# Patient Record
Sex: MALE | Race: WHITE | Employment: OTHER | URBAN - METROPOLITAN AREA
[De-identification: names, ages, dates, MRNs, and addresses within clinical notes are randomized per-mention and may not be internally consistent; named-entity substitution may affect disease eponyms.]

---

## 2019-01-17 ENCOUNTER — HOSPITAL ENCOUNTER (OUTPATIENT)
Age: 69
Discharge: HOME | End: 2019-01-19
Payer: MEDICARE

## 2019-01-17 LAB
ALBUMIN SERPL-MCNC: 4.2 G/DL (ref 3.5–5.2)
ALP BLD-CCNC: 54 U/L (ref 40–129)
ALT SERPL-CCNC: 19 U/L (ref 0–40)
ANION GAP SERPL CALCULATED.3IONS-SCNC: 15 MMOL/L (ref 7–16)
AST SERPL-CCNC: 17 U/L (ref 0–39)
BASOPHILS ABSOLUTE: 0.08 E9/L (ref 0–0.2)
BASOPHILS RELATIVE PERCENT: 1 % (ref 0–2)
BILIRUB SERPL-MCNC: 0.2 MG/DL (ref 0–1.2)
BUN BLDV-MCNC: 13 MG/DL (ref 8–23)
CALCIUM SERPL-MCNC: 9 MG/DL (ref 8.6–10.2)
CHLORIDE BLD-SCNC: 105 MMOL/L (ref 98–107)
CHOLESTEROL, TOTAL: 187 MG/DL (ref 0–199)
CO2: 23 MMOL/L (ref 22–29)
CREAT SERPL-MCNC: 0.9 MG/DL (ref 0.7–1.2)
EOSINOPHILS ABSOLUTE: 0.21 E9/L (ref 0.05–0.5)
EOSINOPHILS RELATIVE PERCENT: 2.7 % (ref 0–6)
GFR AFRICAN AMERICAN: >60
GFR NON-AFRICAN AMERICAN: >60 ML/MIN/1.73
GLUCOSE BLD-MCNC: 107 MG/DL (ref 74–99)
HCT VFR BLD CALC: 44.3 % (ref 37–54)
HDLC SERPL-MCNC: 37 MG/DL
HEMOGLOBIN: 14.1 G/DL (ref 12.5–16.5)
IMMATURE GRANULOCYTES #: 0.04 E9/L
IMMATURE GRANULOCYTES %: 0.5 % (ref 0–5)
LDL CHOLESTEROL CALCULATED: 117 MG/DL (ref 0–99)
LYMPHOCYTES ABSOLUTE: 1.53 E9/L (ref 1.5–4)
LYMPHOCYTES RELATIVE PERCENT: 19.7 % (ref 20–42)
MCH RBC QN AUTO: 31 PG (ref 26–35)
MCHC RBC AUTO-ENTMCNC: 31.8 % (ref 32–34.5)
MCV RBC AUTO: 97.4 FL (ref 80–99.9)
MONOCYTES ABSOLUTE: 0.66 E9/L (ref 0.1–0.95)
MONOCYTES RELATIVE PERCENT: 8.5 % (ref 2–12)
NEUTROPHILS ABSOLUTE: 5.23 E9/L (ref 1.8–7.3)
NEUTROPHILS RELATIVE PERCENT: 67.6 % (ref 43–80)
PDW BLD-RTO: 13.2 FL (ref 11.5–15)
PLATELET # BLD: 251 E9/L (ref 130–450)
PMV BLD AUTO: 11 FL (ref 7–12)
POTASSIUM SERPL-SCNC: 4.6 MMOL/L (ref 3.5–5)
RBC # BLD: 4.55 E12/L (ref 3.8–5.8)
SODIUM BLD-SCNC: 143 MMOL/L (ref 132–146)
TOTAL PROTEIN: 7.1 G/DL (ref 6.4–8.3)
TRIGL SERPL-MCNC: 165 MG/DL (ref 0–149)
TSH SERPL DL<=0.05 MIU/L-ACNC: 1.67 UIU/ML (ref 0.27–4.2)
VITAMIN D 25-HYDROXY: 27 NG/ML (ref 30–100)
VLDLC SERPL CALC-MCNC: 33 MG/DL
WBC # BLD: 7.8 E9/L (ref 4.5–11.5)

## 2019-01-17 PROCEDURE — 82306 VITAMIN D 25 HYDROXY: CPT

## 2019-01-17 PROCEDURE — 84443 ASSAY THYROID STIM HORMONE: CPT

## 2019-01-17 PROCEDURE — 80061 LIPID PANEL: CPT

## 2019-01-17 PROCEDURE — 80053 COMPREHEN METABOLIC PANEL: CPT

## 2019-01-17 PROCEDURE — 85025 COMPLETE CBC W/AUTO DIFF WBC: CPT

## 2019-01-25 ENCOUNTER — HOSPITAL ENCOUNTER (OUTPATIENT)
Age: 69
Discharge: HOME | End: 2019-01-27
Payer: MEDICARE

## 2019-01-25 PROCEDURE — 84153 ASSAY OF PSA TOTAL: CPT

## 2019-01-25 PROCEDURE — G0103 PSA SCREENING: HCPCS

## 2019-03-21 LAB
PROSTATE SPECIFIC ANTIGEN: <0.01 NG/ML (ref 0–4)
PROSTATE SPECIFIC ANTIGEN: ABNORMAL NG/ML (ref 0–4)

## 2019-07-22 ENCOUNTER — HOSPITAL ENCOUNTER (OUTPATIENT)
Age: 69
Discharge: HOME | End: 2019-07-24
Payer: MEDICARE

## 2019-07-22 PROCEDURE — 87088 URINE BACTERIA CULTURE: CPT

## 2019-07-24 LAB — URINE CULTURE, ROUTINE: NORMAL

## 2019-09-04 ENCOUNTER — HOSPITAL ENCOUNTER (OUTPATIENT)
Age: 69
Discharge: HOME | End: 2019-09-06
Payer: MEDICARE

## 2019-09-04 PROCEDURE — 88112 CYTOPATH CELL ENHANCE TECH: CPT

## 2019-10-22 ENCOUNTER — HOSPITAL ENCOUNTER (OUTPATIENT)
Age: 69
Discharge: HOME | End: 2019-10-24

## 2019-10-22 PROCEDURE — 87088 URINE BACTERIA CULTURE: CPT

## 2019-10-24 LAB — URINE CULTURE, ROUTINE: NORMAL

## 2019-11-24 ENCOUNTER — APPOINTMENT (OUTPATIENT)
Dept: GENERAL RADIOLOGY | Age: 69
End: 2019-11-24
Payer: MEDICARE

## 2019-11-24 ENCOUNTER — HOSPITAL ENCOUNTER (EMERGENCY)
Age: 69
Discharge: HOME | End: 2019-11-24
Payer: MEDICARE

## 2019-11-24 ENCOUNTER — HOSPITAL ENCOUNTER (EMERGENCY)
Age: 69
Discharge: HOME | End: 2019-11-24
Attending: EMERGENCY MEDICINE
Payer: MEDICARE

## 2019-11-24 VITALS
TEMPERATURE: 98.2 F | HEIGHT: 70 IN | BODY MASS INDEX: 28.63 KG/M2 | RESPIRATION RATE: 18 BRPM | DIASTOLIC BLOOD PRESSURE: 85 MMHG | HEART RATE: 54 BPM | WEIGHT: 200 LBS | OXYGEN SATURATION: 96 % | SYSTOLIC BLOOD PRESSURE: 157 MMHG

## 2019-11-24 VITALS
BODY MASS INDEX: 27.89 KG/M2 | HEART RATE: 64 BPM | OXYGEN SATURATION: 95 % | RESPIRATION RATE: 16 BRPM | DIASTOLIC BLOOD PRESSURE: 82 MMHG | SYSTOLIC BLOOD PRESSURE: 174 MMHG | WEIGHT: 200 LBS | TEMPERATURE: 97.9 F

## 2019-11-24 DIAGNOSIS — J06.9 ACUTE UPPER RESPIRATORY INFECTION: ICD-10-CM

## 2019-11-24 DIAGNOSIS — R42 DIZZINESS: Primary | ICD-10-CM

## 2019-11-24 DIAGNOSIS — R42 VERTIGO: Primary | ICD-10-CM

## 2019-11-24 DIAGNOSIS — R23.1 CLAMMY SKIN: ICD-10-CM

## 2019-11-24 LAB
ANION GAP SERPL CALCULATED.3IONS-SCNC: 13 MMOL/L (ref 7–16)
BASOPHILS ABSOLUTE: 0.07 E9/L (ref 0–0.2)
BASOPHILS RELATIVE PERCENT: 1 % (ref 0–2)
BUN BLDV-MCNC: 12 MG/DL (ref 8–23)
CALCIUM SERPL-MCNC: 9.8 MG/DL (ref 8.6–10.2)
CHLORIDE BLD-SCNC: 103 MMOL/L (ref 98–107)
CO2: 25 MMOL/L (ref 22–29)
CREAT SERPL-MCNC: 1 MG/DL (ref 0.7–1.2)
EOSINOPHILS ABSOLUTE: 0.13 E9/L (ref 0.05–0.5)
EOSINOPHILS RELATIVE PERCENT: 1.8 % (ref 0–6)
GFR AFRICAN AMERICAN: >60
GFR NON-AFRICAN AMERICAN: >60 ML/MIN/1.73
GLUCOSE BLD-MCNC: 104 MG/DL (ref 74–99)
HCT VFR BLD CALC: 44.3 % (ref 37–54)
HEMOGLOBIN: 14.5 G/DL (ref 12.5–16.5)
IMMATURE GRANULOCYTES #: 0.04 E9/L
IMMATURE GRANULOCYTES %: 0.6 % (ref 0–5)
LYMPHOCYTES ABSOLUTE: 1.59 E9/L (ref 1.5–4)
LYMPHOCYTES RELATIVE PERCENT: 22.3 % (ref 20–42)
MAGNESIUM: 2.1 MG/DL (ref 1.6–2.6)
MCH RBC QN AUTO: 30.9 PG (ref 26–35)
MCHC RBC AUTO-ENTMCNC: 32.7 % (ref 32–34.5)
MCV RBC AUTO: 94.5 FL (ref 80–99.9)
MONOCYTES ABSOLUTE: 0.58 E9/L (ref 0.1–0.95)
MONOCYTES RELATIVE PERCENT: 8.1 % (ref 2–12)
NEUTROPHILS ABSOLUTE: 4.72 E9/L (ref 1.8–7.3)
NEUTROPHILS RELATIVE PERCENT: 66.2 % (ref 43–80)
PDW BLD-RTO: 13 FL (ref 11.5–15)
PLATELET # BLD: 261 E9/L (ref 130–450)
PMV BLD AUTO: 9.7 FL (ref 7–12)
POTASSIUM SERPL-SCNC: 4.5 MMOL/L (ref 3.5–5)
PRO-BNP: 36 PG/ML (ref 0–125)
RBC # BLD: 4.69 E12/L (ref 3.8–5.8)
SODIUM BLD-SCNC: 141 MMOL/L (ref 132–146)
STREP GRP A PCR: NEGATIVE
TROPONIN: <0.01 NG/ML (ref 0–0.03)
WBC # BLD: 7.1 E9/L (ref 4.5–11.5)

## 2019-11-24 PROCEDURE — 93005 ELECTROCARDIOGRAM TRACING: CPT | Performed by: STUDENT IN AN ORGANIZED HEALTH CARE EDUCATION/TRAINING PROGRAM

## 2019-11-24 PROCEDURE — 84484 ASSAY OF TROPONIN QUANT: CPT

## 2019-11-24 PROCEDURE — 83735 ASSAY OF MAGNESIUM: CPT

## 2019-11-24 PROCEDURE — 85025 COMPLETE CBC W/AUTO DIFF WBC: CPT

## 2019-11-24 PROCEDURE — 99212 OFFICE O/P EST SF 10 MIN: CPT

## 2019-11-24 PROCEDURE — 71045 X-RAY EXAM CHEST 1 VIEW: CPT

## 2019-11-24 PROCEDURE — 36415 COLL VENOUS BLD VENIPUNCTURE: CPT

## 2019-11-24 PROCEDURE — 6370000000 HC RX 637 (ALT 250 FOR IP): Performed by: EMERGENCY MEDICINE

## 2019-11-24 PROCEDURE — 80048 BASIC METABOLIC PNL TOTAL CA: CPT

## 2019-11-24 PROCEDURE — 83880 ASSAY OF NATRIURETIC PEPTIDE: CPT

## 2019-11-24 PROCEDURE — 99284 EMERGENCY DEPT VISIT MOD MDM: CPT

## 2019-11-24 PROCEDURE — 87880 STREP A ASSAY W/OPTIC: CPT

## 2019-11-24 RX ORDER — PSEUDOEPHEDRINE HYDROCHLORIDE 30 MG/1
30 TABLET ORAL EVERY 6 HOURS PRN
Qty: 24 TABLET | Refills: 0 | Status: SHIPPED | OUTPATIENT
Start: 2019-11-24 | End: 2019-12-01

## 2019-11-24 RX ORDER — MECLIZINE HCL 12.5 MG/1
25 TABLET ORAL ONCE
Status: COMPLETED | OUTPATIENT
Start: 2019-11-24 | End: 2019-11-24

## 2019-11-24 RX ADMIN — MECLIZINE 25 MG: 12.5 TABLET ORAL at 11:08

## 2019-11-24 ASSESSMENT — ENCOUNTER SYMPTOMS
EYE REDNESS: 0
WHEEZING: 0
CHEST TIGHTNESS: 0
BACK PAIN: 0
SINUS PRESSURE: 0
CONSTIPATION: 0
EYE PAIN: 0
ABDOMINAL PAIN: 0
RHINORRHEA: 0
BLOOD IN STOOL: 0
SORE THROAT: 0
NAUSEA: 0
ABDOMINAL DISTENTION: 0
COUGH: 0
DIARRHEA: 0
VOMITING: 0
SHORTNESS OF BREATH: 0
TROUBLE SWALLOWING: 0
PHOTOPHOBIA: 0

## 2019-11-26 LAB
EKG ATRIAL RATE: 52 BPM
EKG P AXIS: 51 DEGREES
EKG P-R INTERVAL: 168 MS
EKG Q-T INTERVAL: 452 MS
EKG QRS DURATION: 144 MS
EKG QTC CALCULATION (BAZETT): 420 MS
EKG R AXIS: 69 DEGREES
EKG T AXIS: 23 DEGREES
EKG VENTRICULAR RATE: 52 BPM

## 2021-02-12 ENCOUNTER — IMMUNIZATION (OUTPATIENT)
Dept: PRIMARY CARE CLINIC | Age: 71
End: 2021-02-12
Payer: MEDICARE

## 2021-02-12 PROCEDURE — 0011A COVID-19, MODERNA VACCINE 100MCG/0.5ML DOSE: CPT | Performed by: PHYSICIAN ASSISTANT

## 2021-02-12 PROCEDURE — 91301 COVID-19, MODERNA VACCINE 100MCG/0.5ML DOSE: CPT | Performed by: PHYSICIAN ASSISTANT

## 2021-03-15 ENCOUNTER — IMMUNIZATION (OUTPATIENT)
Dept: PRIMARY CARE CLINIC | Age: 71
End: 2021-03-15
Payer: MEDICARE

## 2021-03-15 PROCEDURE — 91301 COVID-19, MODERNA VACCINE 100MCG/0.5ML DOSE: CPT | Performed by: PHYSICIAN ASSISTANT

## 2021-03-15 PROCEDURE — 0012A COVID-19, MODERNA VACCINE 100MCG/0.5ML DOSE: CPT | Performed by: PHYSICIAN ASSISTANT

## 2021-06-02 ENCOUNTER — TELEPHONE (OUTPATIENT)
Dept: VASCULAR SURGERY | Age: 71
End: 2021-06-02

## 2021-06-03 ENCOUNTER — OFFICE VISIT (OUTPATIENT)
Dept: VASCULAR SURGERY | Age: 71
End: 2021-06-03
Payer: MEDICARE

## 2021-06-03 VITALS — WEIGHT: 205 LBS | HEIGHT: 71 IN | BODY MASS INDEX: 28.7 KG/M2

## 2021-06-03 DIAGNOSIS — I65.23 BILATERAL CAROTID ARTERY STENOSIS: ICD-10-CM

## 2021-06-03 DIAGNOSIS — Z87.891 HISTORY OF TOBACCO USE: ICD-10-CM

## 2021-06-03 DIAGNOSIS — I65.23 STENOSIS OF BOTH EXTERNAL CAROTID ARTERIES: ICD-10-CM

## 2021-06-03 PROCEDURE — 99204 OFFICE O/P NEW MOD 45 MIN: CPT | Performed by: SURGERY

## 2021-06-03 PROCEDURE — G8417 CALC BMI ABV UP PARAM F/U: HCPCS | Performed by: SURGERY

## 2021-06-03 PROCEDURE — G8427 DOCREV CUR MEDS BY ELIG CLIN: HCPCS | Performed by: SURGERY

## 2021-06-03 PROCEDURE — 4040F PNEUMOC VAC/ADMIN/RCVD: CPT | Performed by: SURGERY

## 2021-06-03 PROCEDURE — 3017F COLORECTAL CA SCREEN DOC REV: CPT | Performed by: SURGERY

## 2021-06-03 PROCEDURE — 1123F ACP DISCUSS/DSCN MKR DOCD: CPT | Performed by: SURGERY

## 2021-06-03 PROCEDURE — 1036F TOBACCO NON-USER: CPT | Performed by: SURGERY

## 2021-06-03 RX ORDER — NAPROXEN 500 MG/1
500 TABLET ORAL DAILY PRN
Status: ON HOLD | COMMUNITY
End: 2022-06-17 | Stop reason: HOSPADM

## 2021-06-03 RX ORDER — ROSUVASTATIN CALCIUM 10 MG/1
10 TABLET, COATED ORAL DAILY
COMMUNITY
End: 2022-06-14 | Stop reason: ALTCHOICE

## 2021-06-03 RX ORDER — ASPIRIN 81 MG/1
81 TABLET ORAL DAILY
COMMUNITY

## 2021-06-03 NOTE — PROGRESS NOTES
Used   Substance and Sexual Activity    Alcohol use: Yes     Alcohol/week: 1.0 standard drinks     Types: 1 Cans of beer per week    Drug use: No    Sexual activity: Not on file     Comment: N/A   Other Topics Concern    Not on file   Social History Narrative    Not on file     Social Determinants of Health     Financial Resource Strain:     Difficulty of Paying Living Expenses:    Food Insecurity:     Worried About Running Out of Food in the Last Year:     [address] in the Last Year:    Transportation Needs:     Lack of Transportation (Medical):  Lack of Transportation (Non-Medical):    Physical Activity:     Days of Exercise per Week:     Minutes of Exercise per Session:    Stress:     Feeling of Stress :    Social Connections:     Frequency of Communication with Friends and Family:     Frequency of Social Gatherings with Friends and Family:     Attends [employer] Services:     Active Member of Clubs or Organizations:     Attends Club or Organization Meetings:     Marital Status:    Intimate Partner Violence:     Fear of Current or Ex-Partner:     Emotionally Abused:     Physically Abused:     Sexually Abused:        Review of Systems:  Skin:  No abnormal pigmentation or rash  Eyes:  No blurring, diplopia or vision loss  Ears/Nose/Throat:  No hearing loss or vertigo  Respiratory:  No cough, pleuritic chest pain, dyspnea, or wheezing. Cardiovascular: No angina, palpitations . Gastrointestinal:  No nausea or vomiting; no abdominal pain or rectal bleeding  Musculoskeletal:  No arthritis or weakness. Neurologic:  No paralysis, paresis, paresthesia, seizures or headaches  Hematologic/Lymphatic/Immunologic:  No anemia, abnormal bleeding/bruising, fever, chills or night sweats. Endocrine:  No heat or cold intolerance. No polyphagia, polydipsia or polyuria. Physical Exam:  General appearance:  Alert, awake, oriented x 3. No distress. Skin:  Warm and dry  Head:  Normocephalic.   No masses, lesions or tenderness  Eyes:  Conjunctivae appear normal; PERRL  Ears:  External ears normal  Nose/Sinuses:  Septum midline, mucosa normal; no drainage  Oropharynx:  Clear, no exudate noted  Neck:  No jugular venous distention, lymphadenopathy or thyromegaly. Faint right carotid bruit noted  Lungs:  Clear to ausculation bilaterally. No rhonchi, crackles, wheezes  Heart:  Regular rate and rhythm. No rub or murmur  Abdomen:  Soft, non-tender. No masses, organomegaly. Musculoskeletal : No joint effusions, tenderness swelling    Neuro: Speech is intact. Moving all extremities. No focal motor or sensory deficits      Extremities:  Both feet are warm to touch. The color of both feet is normal.        Pulses Right  Left    Brachial 3 3    Radial    3=normal   Femoral 2 2  2=diminished   Popliteal    1=barely palpable   Dorsalis pedis    0=absent   Posterior tibial    4=aneurysmal             Other pertinent information:1. The past medical records were reviewed. 2.  Carotid ultrasound report reviewed, the velocities are consistent with 60% stenosis of the external carotid, however they reported that patient only mild stenosis of the internal carotid upper part with 30% stenosis    Assessment:    1. Bilateral external carotid stenosis of 60%     2. Bilateral internal carotid stenosis of 30%          Plan:       Discussed the patient, options risks benefits and alternatives explained, patient was reassured, informed him, that the external carotid arteries and not clinically important, but to call me anytime with any focal lateralizing neurological symptoms, explained    Patient also was informed, had to have a carotid ultrasound repeated in 3 to 5 days under the supervision of his PCP      Patient was instructed  to call if any focal lateralizing neurological symptoms like loss of speech, vision or loss of function of extremity. All the questions were answered.       Indicated follow-up: Return if symptoms worsen or fail to improve.

## 2021-06-09 ENCOUNTER — TELEPHONE (OUTPATIENT)
Dept: CASE MANAGEMENT | Age: 71
End: 2021-06-09

## 2021-06-09 NOTE — TELEPHONE ENCOUNTER
I called the patient and he confirmed his CT lung screening at [facility] on 6/10/2021 at 1:15 pm.  I reminded the patient to arrive at 12:45 pm, enter through the main entrance, and register. Patient confirmed.           Electronically signed by Edita Tsai on 6/9/21 at 1:13 PM EDT

## 2021-06-10 ENCOUNTER — HOSPITAL ENCOUNTER (OUTPATIENT)
Dept: CT IMAGING | Age: 71
Discharge: HOME | End: 2021-06-10
Payer: MEDICARE

## 2021-06-10 DIAGNOSIS — Z87.891 PERSONAL HISTORY OF TOBACCO USE, PRESENTING HAZARDS TO HEALTH: ICD-10-CM

## 2021-06-10 PROCEDURE — 71271 CT THORAX LUNG CANCER SCR C-: CPT

## 2021-06-11 ENCOUNTER — TELEPHONE (OUTPATIENT)
Dept: CASE MANAGEMENT | Age: 71
End: 2021-06-11

## 2021-06-11 NOTE — TELEPHONE ENCOUNTER
No call, encounter opened to process CT Lung Screening. CT Lung Screen: 6/10/2021      Impression   Emphysematous changes without evidence of a focal pulmonary nodule.       Incidental findings as above.       LUNG RADS:   Per ACR Lung-RADS Version 1.1       Category 1, Negative (No nodules and definitely benign nodules).       Management: Continue annual lung screening with LDCT in 12 months.       RECOMMENDATIONS:   If you would like to [facility] your patient [facility], please contact the Nurse Navigator at   [phone]. Pack years: 27    Social History     Tobacco Use  Smoking Status: Former Smoker    Start Date:    Quit Date: 01/01/2017   Types: Cigarettes   Packs/Day: 1   Years: 30   Pack Years: 27   Smokeless Tobacco: Never Used         Results letter sent to patient via my chart or mailed.      [address]

## 2022-06-13 ENCOUNTER — HOSPITAL ENCOUNTER (OUTPATIENT)
Dept: CT IMAGING | Age: 72
Discharge: HOME | End: 2022-06-13
Payer: MEDICARE

## 2022-06-13 DIAGNOSIS — Z87.891 PERSONAL HISTORY OF TOBACCO USE, PRESENTING HAZARDS TO HEALTH: ICD-10-CM

## 2022-06-13 DIAGNOSIS — F17.211 CIGARETTE NICOTINE DEPENDENCE IN REMISSION: ICD-10-CM

## 2022-06-13 PROCEDURE — 71271 CT THORAX LUNG CANCER SCR C-: CPT

## 2022-06-14 ENCOUNTER — HOSPITAL ENCOUNTER (OUTPATIENT)
Age: 72
Setting detail: OBSERVATION
Discharge: ANOTHER HOSPITAL | End: 2022-06-16
Attending: EMERGENCY MEDICINE | Admitting: INTERNAL MEDICINE
Payer: MEDICARE

## 2022-06-14 ENCOUNTER — APPOINTMENT (OUTPATIENT)
Dept: GENERAL RADIOLOGY | Age: 72
End: 2022-06-14
Payer: MEDICARE

## 2022-06-14 DIAGNOSIS — I10 HYPERTENSION, UNSPECIFIED TYPE: ICD-10-CM

## 2022-06-14 DIAGNOSIS — R07.9 CHEST PAIN WITH HIGH RISK FOR CARDIAC ETIOLOGY: Primary | ICD-10-CM

## 2022-06-14 LAB
ALBUMIN SERPL-MCNC: 4.3 G/DL (ref 3.5–5.2)
ALP BLD-CCNC: 62 U/L (ref 40–129)
ALT SERPL-CCNC: 11 U/L (ref 0–40)
ANION GAP SERPL CALCULATED.3IONS-SCNC: 8 MMOL/L (ref 7–16)
AST SERPL-CCNC: 16 U/L (ref 0–39)
BASOPHILS ABSOLUTE: 0.05 E9/L (ref 0–0.2)
BASOPHILS RELATIVE PERCENT: 0.7 % (ref 0–2)
BILIRUB SERPL-MCNC: <0.2 MG/DL (ref 0–1.2)
BUN BLDV-MCNC: 17 MG/DL (ref 6–23)
CALCIUM SERPL-MCNC: 9.1 MG/DL (ref 8.6–10.2)
CHLORIDE BLD-SCNC: 104 MMOL/L (ref 98–107)
CO2: 27 MMOL/L (ref 22–29)
CREAT SERPL-MCNC: 0.9 MG/DL (ref 0.7–1.2)
EOSINOPHILS ABSOLUTE: 0.21 E9/L (ref 0.05–0.5)
EOSINOPHILS RELATIVE PERCENT: 2.8 % (ref 0–6)
GFR AFRICAN AMERICAN: >60
GFR NON-AFRICAN AMERICAN: >60 ML/MIN/1.73
GLUCOSE BLD-MCNC: 108 MG/DL (ref 74–99)
HCT VFR BLD CALC: 42.4 % (ref 37–54)
HEMOGLOBIN: 13.9 G/DL (ref 12.5–16.5)
IMMATURE GRANULOCYTES #: 0.04 E9/L
IMMATURE GRANULOCYTES %: 0.5 % (ref 0–5)
LYMPHOCYTES ABSOLUTE: 2.06 E9/L (ref 1.5–4)
LYMPHOCYTES RELATIVE PERCENT: 27.2 % (ref 20–42)
MCH RBC QN AUTO: 30.5 PG (ref 26–35)
MCHC RBC AUTO-ENTMCNC: 32.8 % (ref 32–34.5)
MCV RBC AUTO: 93.2 FL (ref 80–99.9)
MONOCYTES ABSOLUTE: 0.68 E9/L (ref 0.1–0.95)
MONOCYTES RELATIVE PERCENT: 9 % (ref 2–12)
NEUTROPHILS ABSOLUTE: 4.52 E9/L (ref 1.8–7.3)
NEUTROPHILS RELATIVE PERCENT: 59.8 % (ref 43–80)
PDW BLD-RTO: 13.3 FL (ref 11.5–15)
PLATELET # BLD: 240 E9/L (ref 130–450)
PMV BLD AUTO: 9.9 FL (ref 7–12)
POTASSIUM REFLEX MAGNESIUM: 4.5 MMOL/L (ref 3.5–5)
PRO-BNP: 36 PG/ML (ref 0–125)
RBC # BLD: 4.55 E12/L (ref 3.8–5.8)
SODIUM BLD-SCNC: 139 MMOL/L (ref 132–146)
TOTAL PROTEIN: 6.9 G/DL (ref 6.4–8.3)
TROPONIN, HIGH SENSITIVITY: 18 NG/L (ref 0–11)
TROPONIN, HIGH SENSITIVITY: 19 NG/L (ref 0–11)
WBC # BLD: 7.6 E9/L (ref 4.5–11.5)

## 2022-06-14 PROCEDURE — 85025 COMPLETE CBC W/AUTO DIFF WBC: CPT

## 2022-06-14 PROCEDURE — 99285 EMERGENCY DEPT VISIT HI MDM: CPT

## 2022-06-14 PROCEDURE — 84484 ASSAY OF TROPONIN QUANT: CPT

## 2022-06-14 PROCEDURE — 71045 X-RAY EXAM CHEST 1 VIEW: CPT

## 2022-06-14 PROCEDURE — 80053 COMPREHEN METABOLIC PANEL: CPT

## 2022-06-14 PROCEDURE — G0378 HOSPITAL OBSERVATION PER HR: HCPCS

## 2022-06-14 PROCEDURE — 83880 ASSAY OF NATRIURETIC PEPTIDE: CPT

## 2022-06-14 PROCEDURE — 36415 COLL VENOUS BLD VENIPUNCTURE: CPT

## 2022-06-14 PROCEDURE — 6370000000 HC RX 637 (ALT 250 FOR IP): Performed by: EMERGENCY MEDICINE

## 2022-06-14 PROCEDURE — 93005 ELECTROCARDIOGRAM TRACING: CPT | Performed by: STUDENT IN AN ORGANIZED HEALTH CARE EDUCATION/TRAINING PROGRAM

## 2022-06-14 RX ORDER — NAPROXEN 500 MG/1
500 TABLET ORAL DAILY PRN
Status: DISCONTINUED | OUTPATIENT
Start: 2022-06-14 | End: 2022-06-15

## 2022-06-14 RX ORDER — PANTOPRAZOLE SODIUM 40 MG/1
40 TABLET, DELAYED RELEASE ORAL
Status: DISCONTINUED | OUTPATIENT
Start: 2022-06-15 | End: 2022-06-16 | Stop reason: HOSPADM

## 2022-06-14 RX ORDER — ACETAMINOPHEN 500 MG
500 TABLET ORAL EVERY 6 HOURS PRN
Status: DISCONTINUED | OUTPATIENT
Start: 2022-06-14 | End: 2022-06-16 | Stop reason: HOSPADM

## 2022-06-14 RX ORDER — POLYETHYLENE GLYCOL 3350 17 G/17G
17 POWDER, FOR SOLUTION ORAL DAILY PRN
Status: DISCONTINUED | OUTPATIENT
Start: 2022-06-14 | End: 2022-06-16 | Stop reason: HOSPADM

## 2022-06-14 RX ORDER — ASPIRIN 81 MG/1
81 TABLET ORAL DAILY
Status: DISCONTINUED | OUTPATIENT
Start: 2022-06-15 | End: 2022-06-16 | Stop reason: HOSPADM

## 2022-06-14 RX ORDER — ASPIRIN 81 MG/1
324 TABLET, CHEWABLE ORAL ONCE
Status: COMPLETED | OUTPATIENT
Start: 2022-06-14 | End: 2022-06-14

## 2022-06-14 RX ORDER — PROCHLORPERAZINE EDISYLATE 5 MG/ML
10 INJECTION INTRAMUSCULAR; INTRAVENOUS EVERY 6 HOURS PRN
Status: DISCONTINUED | OUTPATIENT
Start: 2022-06-14 | End: 2022-06-16 | Stop reason: HOSPADM

## 2022-06-14 RX ADMIN — ASPIRIN 81 MG CHEWABLE TABLET 324 MG: 81 TABLET CHEWABLE at 22:14

## 2022-06-14 ASSESSMENT — ENCOUNTER SYMPTOMS
ABDOMINAL PAIN: 0
PHOTOPHOBIA: 0
TROUBLE SWALLOWING: 0
RHINORRHEA: 0
CONSTIPATION: 0
VOMITING: 0
DIARRHEA: 1
COUGH: 1
VOICE CHANGE: 0
NAUSEA: 0
SHORTNESS OF BREATH: 0

## 2022-06-14 ASSESSMENT — PAIN - FUNCTIONAL ASSESSMENT: PAIN_FUNCTIONAL_ASSESSMENT: NONE - DENIES PAIN

## 2022-06-14 NOTE — ED PROVIDER NOTES
HPI   Patient is a 78-year-old male with past medical history of bilateral coronary stenosis and hypercholesterolemia presenting to the emergency department due to worsening left-sided arm and chest pain. Patient states that his symptoms started acutely yesterday. He reported that he was mowing the lawn when his pain started. Patient localizes pain to his left upper arm and chest region. Pain is intermittent and sharp with radiation to his elbow and neck. Patient states that his pain is worse with exertion. Nothing in particular makes the pain better or worse. He denies any recent trauma to the neck or muscle injury. Patient denies similar pain in the past.  He also is endorsing a tingling sensation in his left arm. Patient also was having cold sweats today. He is denying any other symptoms including nausea, vomiting, headache, fever, chills, lightheadedness, syncope, vision changes, urinary symptoms, abdominal pain, constipation or diarrhea. Patient denies any personal cardiac history. Patient states that he has a chronic cough but this is because he is a former smoker. Review of Systems   Constitutional: Positive for diaphoresis. Negative for chills and fever. Cold sweat   HENT: Negative for congestion, ear pain, rhinorrhea, trouble swallowing and voice change. Eyes: Negative for photophobia and visual disturbance. Respiratory: Positive for cough. Negative for shortness of breath. Cough, chronic and stable   Cardiovascular: Positive for chest pain. Negative for palpitations. Gastrointestinal: Positive for diarrhea. Negative for abdominal pain, constipation, nausea and vomiting. Diarrhea Sunday, resolved. Genitourinary: Negative for dysuria, flank pain, hematuria and urgency. Musculoskeletal: Positive for neck pain. Negative for arthralgias and myalgias. Skin: Negative for rash and wound. Neurological: Positive for numbness.  Negative for dizziness, weakness and headaches. Psychiatric/Behavioral: Negative for behavioral problems and confusion. Physical Exam  Constitutional:       General: He is not in acute distress. Appearance: Normal appearance. He is not ill-appearing. HENT:      Head: Normocephalic and atraumatic. Eyes:      Pupils: Pupils are equal, round, and reactive to light. Cardiovascular:      Rate and Rhythm: Normal rate and regular rhythm. Pulses: Normal pulses. Heart sounds: Normal heart sounds. Pulmonary:      Effort: Pulmonary effort is normal. No respiratory distress. Breath sounds: Normal breath sounds. No wheezing or rales. Abdominal:      Tenderness: There is no abdominal tenderness. There is no guarding or rebound. Musculoskeletal:      Cervical back: Normal range of motion and neck supple. Skin:     General: Skin is warm and dry. Neurological:      General: No focal deficit present. Mental Status: He is alert and oriented to person, place, and time. Cranial Nerves: No cranial nerve deficit. Coordination: Coordination normal.          Procedures   EKG: This EKG is signed and interpreted by me. Sinus bradycardia with ventricular rate of 55 bpm.  DC interval normal.  QTc not prolonged. Right bundle branch block. No evidence of acute ST elevation MI. No significant changes compared to previous EKG on 11/24/2019. MDM   Patient is a 20-year-old male with past medical history of bilateral coronary stenosis and hypercholesterolemia presenting to the emergency department due to worsening left-sided arm and chest pain. Patient's pain started acutely yesterday. On initial evaluation, patient is well-appearing, hemodynamically stable and in no acute distress. Physical exam. Initial troponin 18 with a repeat of 19, delta 1. EKG sinus with no evidence of acute ischemic changes. Chest x-ray was unremarkable. Patient has multiple risk factors and concerning story for ACS.   He has not had a stress test in over 5 years. Given aspirin in the emergency department. Plan to admit the patient for further work-up cardiac work-up and evaluation. Patient hemodynamically stable and agreeable with this plan. Dr. Elke Tena accepted patient for admission. ED Course as of 06/14/22 2136 Tue Jun 14, 2022 2133 ATTENDING PROVIDER ATTESTATION:     I have personally performed and/or participated in the history, exam, medical decision making, and procedures and agree with all pertinent clinical information unless otherwise noted. I have also reviewed and agree with the past medical, family and social history unless otherwise noted. I have discussed this patient in detail with the resident and provided the instruction and education regarding the evidence-based evaluation and treatment of chest pain equivalents. History: Patient states yesterday, while mowing the yard, he began having left-sided chest pain radiating to his left shoulder and into his left neck. The more he would continue to exert himself in the hot weather, the worst of pain with that. When he would sit down to rest, the pain will slowly go away. Today, he was running back and forth between the grill inside the house and he noted that when he would exert himself in this manner, the pain would also come back around but, again go away with rest.    My findings: Jeremy Pathak is a 71 y.o. male whom is in no distress. Physical exam reveals he is alert and oriented. Heart is regular, lungs are clear. Abdomen soft and nontender. Extremities are intact without edema. Good distal pulse and cap refill. Skin is warm and dry. No pain currently. Manipulation of his arms and shoulders does not reproduce the pain. My plan: Symptomatic and supportive care. EKG, x-ray, chest x-ray, labs.   Admission for chest pain evaluation    Electronically signed by Kiana Singh DO on 6/14/22 at 9:33 PM EDT       [TG]      ED Course User Index  [TG] Leo Solomon DO          --------------------------------------------- PAST HISTORY ---------------------------------------------  Past Medical History:  has a past medical history of Arthritis, Bilateral carotid artery stenosis, Cancer ([address] DILLAN (cerebral atherosclerosis), History of tobacco use, Hypercholesterolemia, and Stenosis of both external carotid arteries. Past Surgical History:  has a past surgical history that includes joint replacement; Prostatectomy (2009); and Cardiac catheterization. Social History:  reports that he quit smoking about 5 years ago. He has a 30.00 pack-year smoking history. He has never used smokeless tobacco. He reports current alcohol use of about 1.0 standard drink of alcohol per week. He reports that he does not use drugs. Family History: family history includes Cancer in his father and mother; Mult Sclerosis in his sister. The patients home medications have been reviewed. Allergies: Patient has no known allergies.     -------------------------------------------------- RESULTS -------------------------------------------------    LABS:  Results for orders placed or performed during the hospital encounter of 06/14/22   CBC with Auto Differential   Result Value Ref Range    WBC 7.6 4.5 - 11.5 E9/L    RBC 4.55 3.80 - 5.80 E12/L    Hemoglobin 13.9 12.5 - 16.5 g/dL    Hematocrit 42.4 37.0 - 54.0 %    MCV 93.2 80.0 - 99.9 fL    MCH 30.5 26.0 - 35.0 pg    MCHC 32.8 32.0 - 34.5 %    RDW 13.3 11.5 - 15.0 fL    Platelets 928 250 - 557 E9/L    MPV 9.9 7.0 - 12.0 fL    Neutrophils % 59.8 43.0 - 80.0 %    Immature Granulocytes % 0.5 0.0 - 5.0 %    Lymphocytes % 27.2 20.0 - 42.0 %    Monocytes % 9.0 2.0 - 12.0 %    Eosinophils % 2.8 0.0 - 6.0 %    Basophils % 0.7 0.0 - 2.0 %    Neutrophils Absolute 4.52 1.80 - 7.30 E9/L    Immature Granulocytes # 0.04 E9/L    Lymphocytes Absolute 2.06 1.50 - 4.00 E9/L    Monocytes Absolute 0.68 0.10 - 0.95 E9/L    Eosinophils Absolute 0.21 0.05 - 0.50 E9/L    Basophils Absolute 0.05 0.00 - 0.20 E9/L   Comprehensive Metabolic Panel w/ Reflex to MG   Result Value Ref Range    Sodium 139 132 - 146 mmol/L    Potassium reflex Magnesium 4.5 3.5 - 5.0 mmol/L    Chloride 104 98 - 107 mmol/L    CO2 27 22 - 29 mmol/L    Anion Gap 8 7 - 16 mmol/L    Glucose 108 (H) 74 - 99 mg/dL    BUN 17 6 - 23 mg/dL    CREATININE 0.9 0.7 - 1.2 mg/dL    GFR Non-African American >60 >=60 mL/min/1.73    GFR African American >60     Calcium 9.1 8.6 - 10.2 mg/dL    Total Protein 6.9 6.4 - 8.3 g/dL    Albumin 4.3 3.5 - 5.2 g/dL    Total Bilirubin <0.2 0.0 - 1.2 mg/dL    Alkaline Phosphatase 62 40 - 129 U/L    ALT 11 0 - 40 U/L    AST 16 0 - 39 U/L   Troponin   Result Value Ref Range    Troponin, High Sensitivity 18 (H) 0 - 11 ng/L   Brain Natriuretic Peptide   Result Value Ref Range    Pro-BNP 36 0 - 125 pg/mL   Troponin   Result Value Ref Range    Troponin, High Sensitivity 19 (H) 0 - 11 ng/L   EKG 12 Lead   Result Value Ref Range    Ventricular Rate 55 BPM    Atrial Rate 55 BPM    P-R Interval 160 ms    QRS Duration 146 ms    Q-T Interval 436 ms    QTc Calculation (Bazett) 417 ms    P Axis 54 degrees    R Axis 66 degrees    T Axis 42 degrees       RADIOLOGY:  XR CHEST PORTABLE   Final Result   No pneumonia or pleural effusion. NM Cardiac Stress Test Nuclear Imaging    (Results Pending)     ------------------------- NURSING NOTES AND VITALS REVIEWED ---------------------------  Date / Time Roomed:  6/14/2022  7:24 PM  ED Bed Assignment:  [id number]    The nursing notes within the ED encounter and vital signs as below have been reviewed.      Patient Vitals for the past 24 hrs:   BP Temp Temp src Pulse Resp SpO2 Height Weight   06/14/22 2311 (!) 169/77 97.8 °F (36.6 °C) Oral 56 16 95 % -- --   06/14/22 2200 (!) 161/70 -- -- 54 19 97 % -- --   06/14/22 2104 (!) 160/81 -- -- 61 18 96 % -- --   06/14/22 1915 (!) 205/95 98.4 °F (36.9 °C) Oral 73 16 96 % 5' 11\" (1.803 m) 205 lb (93 kg)   06/14/22 1858 -- -- -- 87 -- 97 % -- --       Oxygen Saturation Interpretation: Normal    ------------------------------------------ PROGRESS NOTES ------------------------------------------    Counseling:  I have spoken with the patient and discussed todays results, in addition to providing specific details for the plan of care and counseling regarding the diagnosis and prognosis. Their questions are answered at this time and they are agreeable with the plan of admission.    --------------------------------- ADDITIONAL PROVIDER NOTES ---------------------------------  Consultations:  Spoke with Dr. Tena. Discussed case. They will admit the patient. This patient's ED course included: a personal history and physicial examination, re-evaluation prior to disposition, multiple bedside re-evaluations, IV medications, cardiac monitoring and continuous pulse oximetry    This patient has remained hemodynamically stable during their ED course. Diagnosis:  1. Chest pain with high risk for cardiac etiology    2. Hypertension, unspecified type        Disposition:  Patient's disposition: Admit to telemetry  Patient's condition is stable.               Gayla Tejeda DO  Resident  06/15/22 [id number]

## 2022-06-15 ENCOUNTER — APPOINTMENT (OUTPATIENT)
Dept: NUCLEAR MEDICINE | Age: 72
End: 2022-06-15
Payer: MEDICARE

## 2022-06-15 ENCOUNTER — APPOINTMENT (OUTPATIENT)
Dept: NON INVASIVE DIAGNOSTICS | Age: 72
End: 2022-06-15
Payer: MEDICARE

## 2022-06-15 LAB
ALBUMIN SERPL-MCNC: 4.1 G/DL (ref 3.5–5.2)
ALP BLD-CCNC: 61 U/L (ref 40–129)
ALT SERPL-CCNC: 13 U/L (ref 0–40)
ANION GAP SERPL CALCULATED.3IONS-SCNC: 9 MMOL/L (ref 7–16)
AST SERPL-CCNC: 15 U/L (ref 0–39)
BASOPHILS ABSOLUTE: 0.05 E9/L (ref 0–0.2)
BASOPHILS RELATIVE PERCENT: 0.8 % (ref 0–2)
BILIRUB SERPL-MCNC: 0.5 MG/DL (ref 0–1.2)
BUN BLDV-MCNC: 15 MG/DL (ref 6–23)
CALCIUM SERPL-MCNC: 8.7 MG/DL (ref 8.6–10.2)
CHLORIDE BLD-SCNC: 105 MMOL/L (ref 98–107)
CHOLESTEROL, TOTAL: 159 MG/DL (ref 0–199)
CO2: 24 MMOL/L (ref 22–29)
CREAT SERPL-MCNC: 0.8 MG/DL (ref 0.7–1.2)
EKG ATRIAL RATE: 55 BPM
EKG P AXIS: 54 DEGREES
EKG P-R INTERVAL: 160 MS
EKG Q-T INTERVAL: 436 MS
EKG QRS DURATION: 146 MS
EKG QTC CALCULATION (BAZETT): 417 MS
EKG R AXIS: 66 DEGREES
EKG T AXIS: 42 DEGREES
EKG VENTRICULAR RATE: 55 BPM
EOSINOPHILS ABSOLUTE: 0.21 E9/L (ref 0.05–0.5)
EOSINOPHILS RELATIVE PERCENT: 3.5 % (ref 0–6)
GFR AFRICAN AMERICAN: >60
GFR NON-AFRICAN AMERICAN: >60 ML/MIN/1.73
GLUCOSE BLD-MCNC: 98 MG/DL (ref 74–99)
HCT VFR BLD CALC: 41.9 % (ref 37–54)
HDLC SERPL-MCNC: 37 MG/DL
HEMOGLOBIN: 13.5 G/DL (ref 12.5–16.5)
IMMATURE GRANULOCYTES #: 0.02 E9/L
IMMATURE GRANULOCYTES %: 0.3 % (ref 0–5)
LDL CHOLESTEROL CALCULATED: 91 MG/DL (ref 0–99)
LV EF: 68 %
LVEF MODALITY: NORMAL
LYMPHOCYTES ABSOLUTE: 1.61 E9/L (ref 1.5–4)
LYMPHOCYTES RELATIVE PERCENT: 27 % (ref 20–42)
MAGNESIUM: 2 MG/DL (ref 1.6–2.6)
MCH RBC QN AUTO: 30.8 PG (ref 26–35)
MCHC RBC AUTO-ENTMCNC: 32.2 % (ref 32–34.5)
MCV RBC AUTO: 95.4 FL (ref 80–99.9)
MONOCYTES ABSOLUTE: 0.56 E9/L (ref 0.1–0.95)
MONOCYTES RELATIVE PERCENT: 9.4 % (ref 2–12)
NEUTROPHILS ABSOLUTE: 3.51 E9/L (ref 1.8–7.3)
NEUTROPHILS RELATIVE PERCENT: 59 % (ref 43–80)
PDW BLD-RTO: 13.4 FL (ref 11.5–15)
PLATELET # BLD: 229 E9/L (ref 130–450)
PMV BLD AUTO: 10.3 FL (ref 7–12)
POTASSIUM SERPL-SCNC: 4.1 MMOL/L (ref 3.5–5)
RBC # BLD: 4.39 E12/L (ref 3.8–5.8)
SODIUM BLD-SCNC: 138 MMOL/L (ref 132–146)
TOTAL PROTEIN: 6.4 G/DL (ref 6.4–8.3)
TRIGL SERPL-MCNC: 153 MG/DL (ref 0–149)
TSH SERPL DL<=0.05 MIU/L-ACNC: 1.75 UIU/ML (ref 0.27–4.2)
VLDLC SERPL CALC-MCNC: 31 MG/DL
WBC # BLD: 6 E9/L (ref 4.5–11.5)

## 2022-06-15 PROCEDURE — 80053 COMPREHEN METABOLIC PANEL: CPT

## 2022-06-15 PROCEDURE — 3430000000 HC RX DIAGNOSTIC RADIOPHARMACEUTICAL: Performed by: RADIOLOGY

## 2022-06-15 PROCEDURE — 6370000000 HC RX 637 (ALT 250 FOR IP): Performed by: INTERNAL MEDICINE

## 2022-06-15 PROCEDURE — G0378 HOSPITAL OBSERVATION PER HR: HCPCS

## 2022-06-15 PROCEDURE — 78452 HT MUSCLE IMAGE SPECT MULT: CPT

## 2022-06-15 PROCEDURE — 93017 CV STRESS TEST TRACING ONLY: CPT

## 2022-06-15 PROCEDURE — 6360000002 HC RX W HCPCS: Performed by: INTERNAL MEDICINE

## 2022-06-15 PROCEDURE — 36415 COLL VENOUS BLD VENIPUNCTURE: CPT

## 2022-06-15 PROCEDURE — 83735 ASSAY OF MAGNESIUM: CPT

## 2022-06-15 PROCEDURE — A9500 TC99M SESTAMIBI: HCPCS | Performed by: RADIOLOGY

## 2022-06-15 PROCEDURE — 78452 HT MUSCLE IMAGE SPECT MULT: CPT | Performed by: INTERNAL MEDICINE

## 2022-06-15 PROCEDURE — 85025 COMPLETE CBC W/AUTO DIFF WBC: CPT

## 2022-06-15 PROCEDURE — 80061 LIPID PANEL: CPT

## 2022-06-15 PROCEDURE — 84443 ASSAY THYROID STIM HORMONE: CPT

## 2022-06-15 RX ORDER — LISINOPRIL 10 MG/1
10 TABLET ORAL DAILY
Status: DISCONTINUED | OUTPATIENT
Start: 2022-06-15 | End: 2022-06-16 | Stop reason: HOSPADM

## 2022-06-15 RX ORDER — NITROGLYCERIN 0.4 MG/1
0.4 TABLET SUBLINGUAL EVERY 5 MIN PRN
Qty: 25 TABLET | Refills: 3 | Status: SHIPPED | OUTPATIENT
Start: 2022-06-15 | End: 2022-07-28 | Stop reason: ALTCHOICE

## 2022-06-15 RX ORDER — ATORVASTATIN CALCIUM 40 MG/1
40 TABLET, FILM COATED ORAL NIGHTLY
Status: DISCONTINUED | OUTPATIENT
Start: 2022-06-15 | End: 2022-06-16 | Stop reason: HOSPADM

## 2022-06-15 RX ORDER — PANTOPRAZOLE SODIUM 40 MG/1
40 TABLET, DELAYED RELEASE ORAL
Qty: 30 TABLET | Refills: 3 | Status: SHIPPED | OUTPATIENT
Start: 2022-06-16 | End: 2022-07-28

## 2022-06-15 RX ORDER — LISINOPRIL 10 MG/1
10 TABLET ORAL DAILY
Qty: 30 TABLET | Refills: 3 | Status: SHIPPED | OUTPATIENT
Start: 2022-06-15 | End: 2022-07-28 | Stop reason: ALTCHOICE

## 2022-06-15 RX ORDER — KETOROLAC TROMETHAMINE 15 MG/ML
15 INJECTION, SOLUTION INTRAMUSCULAR; INTRAVENOUS EVERY 6 HOURS PRN
Status: DISCONTINUED | OUTPATIENT
Start: 2022-06-15 | End: 2022-06-16 | Stop reason: HOSPADM

## 2022-06-15 RX ADMIN — REGADENOSON 0.4 MG: 0.08 INJECTION, SOLUTION INTRAVENOUS at 11:24

## 2022-06-15 RX ADMIN — ASPIRIN 81 MG: 81 TABLET, COATED ORAL at 09:57

## 2022-06-15 RX ADMIN — LISINOPRIL 10 MG: 10 TABLET ORAL at 13:58

## 2022-06-15 RX ADMIN — Medication 10 MILLICURIE: at 07:35

## 2022-06-15 RX ADMIN — ATORVASTATIN CALCIUM 40 MG: 40 TABLET, FILM COATED ORAL at 21:46

## 2022-06-15 RX ADMIN — Medication 30 MILLICURIE: at 13:11

## 2022-06-15 NOTE — CARE COORDINATION
6/15/2022 1028 CM note: No covid testing. Met with patient and his wife at bedside for transition of care needs. He is independent with ADLs and drives. PCP is Dr Enrique Kapadia and uses [facility]. Pt plans to return home at [place]. Await stress test results.  Jeronimo MUSA

## 2022-06-15 NOTE — PROCEDURES
[address]                              CARDIAC STRESS TEST    PATIENT NAME: Darron Dominguez                     :        1950  MED REC NO:   [id number]                            ROOM:       [id number]  ACCOUNT NO:   [de-identified]                           ADMIT DATE: 2022  PROVIDER:     Lissett Castelan DO    CARDIOVASCULAR DIAGNOSTIC DEPARTMENT    DATE OF STUDY:  06/15/2022    IV LEXISCAN STRESS TEST    PATIENT OF:  Erik Huertas DO    This 67-year-old male had an IV Lexiscan stress test for evaluation of  recurrent chest pain. The patient's heart rate response to the IV Lexiscan was thought to be  physiologic. The patient's blood pressure response was hypertensive. The patient had  systolic blood pressure 403/14. The patient denied any chest pain during the test.  There was no neck or  left arm discomfort. There was no atrial ectopy noted. There was no ventricular ectopy noted. There was very mild nonspecific T-wave changes noted. IMPRESSION:  There was no electrocardiographic or clinical evidence of  IV Lexiscan-induced coronary ischemia. The patient was sent to Nuclear Medicine for imaging.         Yousuf Romeo DO    D: 06/15/2022 11:30:52       T: 06/15/2022 12:21:32     [email or web address]  Job#: [id number]     Doc#: [id number]    CC:

## 2022-06-15 NOTE — H&P
Department of Internal Medicine        CHIEF COMPLAINT:  CP    Reason for Admission:  Chest pain-possible ACS    HISTORY OF PRESENT ILLNESS:      The patient is a 71 y.o. male who presents with mid left-sided chest pain along with pain rating to left arm. Patient's discomfort started acutely on 6/13. Patient was mowing his lawn when the pain started. Patient states that the pain is sharp and intermittent with radiation to the elbow and neck. The pain is worse with exertion. Patient also has some tingling sensation in his left arm. He was having cold sweats yesterday. Patient denied any other problems such as nausea/vomiting, fever/chills, dizziness or syncope. Patient does have a chronic cough with history of prior tobacco abuse. Past Medical History:    Past Medical History:   Diagnosis Date    Arthritis     Bilateral carotid artery stenosis 6/3/2021    Cancer [facility])     prostate 2009    DILLAN (cerebral atherosclerosis)     History of tobacco use 6/3/2021    Hypercholesterolemia     Stenosis of both external carotid arteries 6/3/2021     Past Surgical History:    Past Surgical History:   Procedure Laterality Date    CARDIAC CATHETERIZATION      JOINT REPLACEMENT      knee    PROSTATECTOMY  2009       Medications Prior to Admission:    @  Prior to Admission medications    Medication Sig Start Date End Date Taking? Authorizing Provider   naproxen (NAPROSYN) 500 MG tablet Take 500 mg by mouth daily as needed for Pain     Historical Provider, MD   aspirin 81 MG EC tablet Take 81 mg by mouth daily    Historical Provider, MD       Allergies:  Patient has no known allergies.     Social History:   Social History     Socioeconomic History    Marital status:      Spouse name: Not on file    Number of children: Not on file    Years of education: Not on file    Highest education level: Not on file   Occupational History    Not on file   Tobacco Use    Smoking status: Former Smoker     Packs/day: 1.00     Years: 30.00     Pack years: 30.00     Quit date: 2017     Years since quittin.4    Smokeless tobacco: Never Used   Substance and Sexual Activity    Alcohol use: Yes     Alcohol/week: 1.0 standard drink     Types: 1 Cans of beer per week    Drug use: No    Sexual activity: Not on file     Comment: N/A   Other Topics Concern    Not on file   Social History Narrative    Not on file     Social Determinants of Health     Financial Resource Strain:     Difficulty of Paying Living Expenses: Not on file   Food Insecurity:     Worried About Running Out of Food in the Last Year: Not on file    Johanna of Food in the Last Year: Not on file   Transportation Needs:     Lack of Transportation (Medical): Not on file    Lack of Transportation (Non-Medical): Not on file   Physical Activity:     Days of Exercise per Week: Not on file    Minutes of Exercise per Session: Not on file   Stress:     Feeling of Stress : Not on file   Social Connections:     Frequency of Communication with Friends and Family: Not on file    Frequency of Social Gatherings with Friends and Family: Not on file    Attends Sabianism Services: Not on file    Active Member of [address] [place] [address] [employer] or Organizations: Not on file    Attends Club or Organization Meetings: Not on file    Marital Status: Not on file   Intimate Partner Violence:     Fear of Current or Ex-Partner: Not on file    Emotionally Abused: Not on file    Physically Abused: Not on file    Sexually Abused: Not on file   Housing Stability:     Unable to Pay for Housing in the Last Year: Not on file    Number of [place] in the Last Year: Not on file    Unstable Housing in the Last Year: Not on file       Family History:   Family History   Problem Relation Age of Onset    Cancer Mother         bladder    Cancer Father         nose melanoma    Mult Sclerosis Sister        REVIEW OF SYSTEMS:    Gen: Patient denies any lightheadedness or dizziness. No LOC or syncope. No fevers or chills. HEENT: No earache, sore throat or nasal congestion. Resp: Chronic cough,no hemoptysis or sputum production. Cardiac: + chest pain, SOB,no diaphoresis or palpitations. GI: No nausea, vomiting, diarrhea or constipation. No melena or hematochezia. : No urinary complaints, dysuria, hematuria or frequency. MSK: No extremity weakness, paralysis or paresthesias. PHYSICAL EXAM:    Vitals:  BP (!) 169/77   Pulse 56   Temp 97.8 °F (36.6 °C) (Oral)   Resp 16   Ht 5' 11\" (1.803 m)   Wt 205 lb (93 kg)   SpO2 95%   BMI 28.59 kg/m²     General:  This is a 71 y.o. yo male who is alert and oriented in NAD  HEENT:  Head is normocephalic and atraumatic, PERRLA, EOMI, mucus membranes moist with no pharyngeal erythema or exudate. Neck:  Supple with no carotid bruits, JVD or thyromegaly.   No cervical adenopathy  CV:  Regular rate and rhythm, no murmurs  Lungs:  Coarse breath sounds to auscultation bilaterally with no wheezes, rales or rhonchi  Abdomen:  Soft, nontender, nondistended, bowel sounds present  Extremities:  No edema, peripheral pulses intact bilaterally  Neuro:  Cranial nerves II-XII grossly intact; motor and sensory function intact with no focal deficits  Skin:  No rashes, lesions or wounds    DATA:  CBC with Differential:    Lab Results   Component Value Date    WBC 6.0 06/15/2022    RBC 4.39 06/15/2022    HGB 13.5 06/15/2022    HCT 41.9 06/15/2022     06/15/2022    MCV 95.4 06/15/2022    MCH 30.8 06/15/2022    MCHC 32.2 06/15/2022    RDW 13.4 06/15/2022    SEGSPCT 62 01/15/2014    LYMPHOPCT 27.0 06/15/2022    MONOPCT 9.4 06/15/2022    BASOPCT 0.8 06/15/2022    MONOSABS 0.56 06/15/2022    LYMPHSABS 1.61 06/15/2022    EOSABS 0.21 06/15/2022    BASOSABS 0.05 06/15/2022     CMP:    Lab Results   Component Value Date     06/14/2022    K 4.5 06/14/2022     06/14/2022    CO2 27 06/14/2022    BUN 17 06/14/2022    CREATININE 0.9 06/14/2022    GFRAA >60 06/14/2022    LABGLOM >60 06/14/2022    GLUCOSE 108 06/14/2022    GLUCOSE 82 09/19/2011    PROT 6.9 06/14/2022    LABALBU 4.3 06/14/2022    LABALBU 4.8 09/19/2011    CALCIUM 9.1 06/14/2022    BILITOT <0.2 06/14/2022    ALKPHOS 62 06/14/2022    AST 16 06/14/2022    ALT 11 06/14/2022     Magnesium:    Lab Results   Component Value Date    MG 2.1 11/24/2019     Phosphorus:  No results found for: PHOS  PT/INR:    Lab Results   Component Value Date    PROTIME 12.8 11/19/2013    INR 1.2 11/19/2013     Troponin:    Lab Results   Component Value Date    TROPONINI <0.01 11/24/2019     U/A:  No results found for: NITRITE, COLORU, PROTEINU, PHUR, LABCAST, WBCUA, RBCUA, MUCUS, TRICHOMONAS, YEAST, BACTERIA, CLARITYU, SPECGRAV, LEUKOCYTESUR, UROBILINOGEN, BILIRUBINUR, BLOODU, GLUCOSEU, AMORPHOUS  ABG:  No results found for: PH, PCO2, PO2, HCO3, BE, THGB, TCO2, O2SAT  HgBA1c:    Lab Results   Component Value Date    LABA1C 5.4 07/22/2015     FLP:    Lab Results   Component Value Date    TRIG 165 01/17/2019    HDL 37 01/17/2019    LDLCALC 117 01/17/2019    LABVLDL 33 01/17/2019     TSH:    Lab Results   Component Value Date    TSH 1.670 01/17/2019     IRON:  No results found for: IRON  LIPASE:  No results found for: LIPASE    ASSESSMENT AND PLAN:      Patient Active Problem List    Diagnosis Date Noted    Chest pain with high risk for cardiac etiology 06/14/2022    Chest pain 06/14/2022    Stenosis of both external carotid arteries 06/03/2021    Bilateral carotid artery stenosis 06/03/2021    History of tobacco use 06/03/2021    Chest discomfort 11/19/2013     Impression:  1. Chest pain and L arm pain-rule out ACS  2. Hx mild -mod carotid artery dx  3. Hx prior tobacco abuse  4. Hx prostate Ca-2009  5. HLD  6.   Elevated hypertension    Plan:  Observation to telemetry  Home meds reviewed  General diet  Nitro sl q 5 min prn  Activity up ab alyson  IV Lexiscan stress test 6/15    If the IV Lexiscan stress test is normal patient will be discharged home today.       Bar [place] DO, D.O.  6/15/2022  7:08 AM

## 2022-06-15 NOTE — ACP (ADVANCE CARE PLANNING)
Advance Care Planning   Healthcare Decision Maker:    Primary Decision Maker: Miki Manrique [facility] - [phone]    Click here to complete Healthcare Decision Makers including selection of the Healthcare Decision Maker Relationship (ie \"Primary\").

## 2022-06-16 ENCOUNTER — HOSPITAL ENCOUNTER (INPATIENT)
Age: 72
LOS: 1 days | Discharge: HOME | DRG: 247 | End: 2022-06-17
Attending: FAMILY MEDICINE | Admitting: INTERNAL MEDICINE
Payer: MEDICARE

## 2022-06-16 VITALS
RESPIRATION RATE: 18 BRPM | HEART RATE: 63 BPM | SYSTOLIC BLOOD PRESSURE: 162 MMHG | WEIGHT: 205 LBS | BODY MASS INDEX: 28.7 KG/M2 | TEMPERATURE: 97.7 F | DIASTOLIC BLOOD PRESSURE: 54 MMHG | OXYGEN SATURATION: 93 % | HEIGHT: 71 IN

## 2022-06-16 DIAGNOSIS — I25.10 CAD IN NATIVE ARTERY: ICD-10-CM

## 2022-06-16 LAB
ABO/RH: NORMAL
ANTIBODY SCREEN: NORMAL
SARS-COV-2, NAAT: NOT DETECTED

## 2022-06-16 PROCEDURE — 99222 1ST HOSP IP/OBS MODERATE 55: CPT | Performed by: INTERNAL MEDICINE

## 2022-06-16 PROCEDURE — C1894 INTRO/SHEATH, NON-LASER: HCPCS

## 2022-06-16 PROCEDURE — 6370000000 HC RX 637 (ALT 250 FOR IP): Performed by: INTERNAL MEDICINE

## 2022-06-16 PROCEDURE — 6370000000 HC RX 637 (ALT 250 FOR IP): Performed by: FAMILY MEDICINE

## 2022-06-16 PROCEDURE — C1725 CATH, TRANSLUMIN NON-LASER: HCPCS

## 2022-06-16 PROCEDURE — 87635 SARS-COV-2 COVID-19 AMP PRB: CPT

## 2022-06-16 PROCEDURE — C1769 GUIDE WIRE: HCPCS

## 2022-06-16 PROCEDURE — 93458 L HRT ARTERY/VENTRICLE ANGIO: CPT

## 2022-06-16 PROCEDURE — 2140000000 HC CCU INTERMEDIATE R&B

## 2022-06-16 PROCEDURE — 2580000003 HC RX 258

## 2022-06-16 PROCEDURE — G0378 HOSPITAL OBSERVATION PER HR: HCPCS

## 2022-06-16 PROCEDURE — 6370000000 HC RX 637 (ALT 250 FOR IP)

## 2022-06-16 PROCEDURE — 86850 RBC ANTIBODY SCREEN: CPT

## 2022-06-16 PROCEDURE — APPSS60 APP SPLIT SHARED TIME 46-60 MINUTES: Performed by: PHYSICIAN ASSISTANT

## 2022-06-16 PROCEDURE — 36415 COLL VENOUS BLD VENIPUNCTURE: CPT

## 2022-06-16 PROCEDURE — C1887 CATHETER, GUIDING: HCPCS

## 2022-06-16 PROCEDURE — 2709999900 HC NON-CHARGEABLE SUPPLY

## 2022-06-16 PROCEDURE — C1874 STENT, COATED/COV W/DEL SYS: HCPCS

## 2022-06-16 PROCEDURE — 86901 BLOOD TYPING SEROLOGIC RH(D): CPT

## 2022-06-16 PROCEDURE — 6360000002 HC RX W HCPCS

## 2022-06-16 PROCEDURE — C9600 PERC DRUG-EL COR STENT SING: HCPCS

## 2022-06-16 PROCEDURE — 93005 ELECTROCARDIOGRAM TRACING: CPT | Performed by: INTERNAL MEDICINE

## 2022-06-16 PROCEDURE — 86900 BLOOD TYPING SEROLOGIC ABO: CPT

## 2022-06-16 PROCEDURE — C1760 CLOSURE DEV, VASC: HCPCS

## 2022-06-16 PROCEDURE — 2500000003 HC RX 250 WO HCPCS

## 2022-06-16 RX ORDER — ACETAMINOPHEN 325 MG/1
650 TABLET ORAL EVERY 6 HOURS PRN
Status: DISCONTINUED | OUTPATIENT
Start: 2022-06-16 | End: 2022-06-17 | Stop reason: HOSPADM

## 2022-06-16 RX ORDER — POLYETHYLENE GLYCOL 3350 17 G/17G
17 POWDER, FOR SOLUTION ORAL DAILY PRN
Status: DISCONTINUED | OUTPATIENT
Start: 2022-06-16 | End: 2022-06-17 | Stop reason: HOSPADM

## 2022-06-16 RX ORDER — SODIUM CHLORIDE 9 MG/ML
INJECTION, SOLUTION INTRAVENOUS PRN
Status: DISCONTINUED | OUTPATIENT
Start: 2022-06-16 | End: 2022-06-17 | Stop reason: HOSPADM

## 2022-06-16 RX ORDER — PANTOPRAZOLE SODIUM 40 MG/1
40 TABLET, DELAYED RELEASE ORAL
Status: DISCONTINUED | OUTPATIENT
Start: 2022-06-17 | End: 2022-06-17 | Stop reason: HOSPADM

## 2022-06-16 RX ORDER — ASPIRIN 81 MG/1
243 TABLET, CHEWABLE ORAL ONCE
Status: COMPLETED | OUTPATIENT
Start: 2022-06-16 | End: 2022-06-16

## 2022-06-16 RX ORDER — ASPIRIN 81 MG/1
81 TABLET ORAL DAILY
Status: DISCONTINUED | OUTPATIENT
Start: 2022-06-17 | End: 2022-06-17 | Stop reason: HOSPADM

## 2022-06-16 RX ORDER — ACETAMINOPHEN 325 MG/1
650 TABLET ORAL EVERY 4 HOURS PRN
Status: DISCONTINUED | OUTPATIENT
Start: 2022-06-16 | End: 2022-06-17 | Stop reason: HOSPADM

## 2022-06-16 RX ORDER — ROSUVASTATIN CALCIUM 20 MG/1
20 TABLET, COATED ORAL DAILY
Status: DISCONTINUED | OUTPATIENT
Start: 2022-06-17 | End: 2022-06-17 | Stop reason: HOSPADM

## 2022-06-16 RX ORDER — ENOXAPARIN SODIUM 100 MG/ML
40 INJECTION SUBCUTANEOUS DAILY
Status: DISCONTINUED | OUTPATIENT
Start: 2022-06-16 | End: 2022-06-17 | Stop reason: HOSPADM

## 2022-06-16 RX ORDER — ONDANSETRON 2 MG/ML
4 INJECTION INTRAMUSCULAR; INTRAVENOUS EVERY 6 HOURS PRN
Status: DISCONTINUED | OUTPATIENT
Start: 2022-06-16 | End: 2022-06-17 | Stop reason: HOSPADM

## 2022-06-16 RX ORDER — SODIUM CHLORIDE 0.9 % (FLUSH) 0.9 %
5-40 SYRINGE (ML) INJECTION EVERY 12 HOURS SCHEDULED
Status: DISCONTINUED | OUTPATIENT
Start: 2022-06-16 | End: 2022-06-17 | Stop reason: HOSPADM

## 2022-06-16 RX ORDER — ACETAMINOPHEN 650 MG/1
650 SUPPOSITORY RECTAL EVERY 6 HOURS PRN
Status: DISCONTINUED | OUTPATIENT
Start: 2022-06-16 | End: 2022-06-17 | Stop reason: HOSPADM

## 2022-06-16 RX ORDER — SODIUM CHLORIDE 0.9 % (FLUSH) 0.9 %
5-40 SYRINGE (ML) INJECTION PRN
Status: DISCONTINUED | OUTPATIENT
Start: 2022-06-16 | End: 2022-06-17 | Stop reason: HOSPADM

## 2022-06-16 RX ORDER — SODIUM CHLORIDE 0.9 % (FLUSH) 0.9 %
10 SYRINGE (ML) INJECTION EVERY 12 HOURS SCHEDULED
Status: DISCONTINUED | OUTPATIENT
Start: 2022-06-16 | End: 2022-06-17 | Stop reason: HOSPADM

## 2022-06-16 RX ORDER — SODIUM CHLORIDE 0.9 % (FLUSH) 0.9 %
10 SYRINGE (ML) INJECTION PRN
Status: DISCONTINUED | OUTPATIENT
Start: 2022-06-16 | End: 2022-06-17 | Stop reason: HOSPADM

## 2022-06-16 RX ORDER — LISINOPRIL 10 MG/1
10 TABLET ORAL DAILY
Status: DISCONTINUED | OUTPATIENT
Start: 2022-06-16 | End: 2022-06-17 | Stop reason: HOSPADM

## 2022-06-16 RX ORDER — PRASUGREL 10 MG/1
10 TABLET, FILM COATED ORAL DAILY
Status: DISCONTINUED | OUTPATIENT
Start: 2022-06-17 | End: 2022-06-17 | Stop reason: HOSPADM

## 2022-06-16 RX ORDER — PROMETHAZINE HYDROCHLORIDE 12.5 MG/1
12.5 TABLET ORAL EVERY 6 HOURS PRN
Status: DISCONTINUED | OUTPATIENT
Start: 2022-06-16 | End: 2022-06-17 | Stop reason: HOSPADM

## 2022-06-16 RX ADMIN — ASPIRIN 243 MG: 81 TABLET, CHEWABLE ORAL at 16:05

## 2022-06-16 RX ADMIN — ASPIRIN 81 MG: 81 TABLET, COATED ORAL at 09:19

## 2022-06-16 RX ADMIN — LISINOPRIL 10 MG: 10 TABLET ORAL at 19:35

## 2022-06-16 RX ADMIN — PANTOPRAZOLE SODIUM 40 MG: 40 TABLET, DELAYED RELEASE ORAL at 09:19

## 2022-06-16 ASSESSMENT — PAIN SCALES - GENERAL
PAINLEVEL_OUTOF10: 0
PAINLEVEL_OUTOF10: 0

## 2022-06-16 NOTE — H&P
Hospitalist History & Physical      PCP: Betty Saleh DO    Date of Service: Pt seen/examined on 6/16/2022    Chief Complaint:  had no chief complaint listed for this encounter. History Of Present Illness:    Mr. Gabi Leonard, a 71y.o. year old male  who  has a past medical history of Arthritis, Bilateral carotid artery stenosis, CAD in native artery, Cancer ([address] DILLAN (cerebral atherosclerosis), History of tobacco use, Hypercholesterolemia, and Stenosis of both external carotid arteries. Patient transferred from [address] [place] [address] for cardiac catheterization. Patient was admitted to [address] [place] [address] after presenting with left-sided chest pain with radiation to left arm. This began on June 13. Patient was mowing his lawn when it started. Patient reports the pain is sharp, intermittent with radiation to elbow and neck. Worse with exertion. Patient had a stress test with nuclear images that showed a small to medium sized, medium intensity mid to distal apical wall scar with mild ischemia. EF 60%, intermediate risk scan. Patient was seen by cardiology and started on aspirin and statin therapy. Case was discussed with patient's cardiologist Dr. Drea Aden who advised transfer to [facility] for cardiac catheterization. Past Medical History:   Diagnosis Date    Arthritis     Bilateral carotid artery stenosis 6/3/2021    CAD in native artery 6/16/2022    Cancer [facility])     prostate 2009    DILLAN (cerebral atherosclerosis)     History of tobacco use 6/3/2021    Hypercholesterolemia     Stenosis of both external carotid arteries 6/3/2021       Past Surgical History:   Procedure Laterality Date    CARDIAC CATHETERIZATION      CARDIOVASCULAR STRESS TEST N/A 06/15/2022    lexiscan stress test    JOINT REPLACEMENT      knee    PROSTATECTOMY  01/01/2009       Prior to Admission medications    Medication Sig Start Date End Date Taking?  Authorizing Provider   pantoprazole (PROTONIX) 40 MG tablet Take 1 tablet by mouth every morning (before breakfast) 6/16/22   Jesús Sandoval DO   nitroGLYCERIN (NITROSTAT) 0.4 MG SL tablet Place 1 tablet under the tongue every 5 minutes as needed for Chest pain up to max of 3 total doses. If no relief after 1 dose, call 911. 6/15/22   Jesús Sandoval DO   lisinopril (PRINIVIL;ZESTRIL) 10 MG tablet Take 1 tablet by mouth daily 6/15/22   Jesús Sandoval DO   naproxen (NAPROSYN) 500 MG tablet Take 500 mg by mouth daily as needed for Pain     Historical Provider, MD   aspirin 81 MG EC tablet Take 81 mg by mouth daily    Historical Provider, MD         Allergies:  Patient has no known allergies. Social History:    TOBACCO:   reports that he quit smoking about 5 years ago. He has a 30.00 pack-year smoking history. He has never used smokeless tobacco.  ETOH:   reports current alcohol use of about 1.0 standard drink of alcohol per week. Family History:    Reviewed in detail and negative for DM, CAD, Cancer, CVA. Positive as follows\"      Problem Relation Age of Onset    Cancer Mother         bladder    Cancer Father         nose melanoma    Mult Sclerosis Sister        REVIEW OF SYSTEMS:   Pertinent positives as noted in the HPI. All other systems reviewed and negative. PHYSICAL EXAM:  There were no vitals taken for this visit. General appearance: No apparent distress, appears stated age and cooperative. HEENT: Normal cephalic, atraumatic without obvious deformity. Pupils equal, round, and reactive to light. Extra ocular muscles intact. Conjunctivae/corneas clear. Neck: Supple, with full range of motion. No jugular venous distention. Trachea midline. Respiratory: Clear to auscultation bilaterally  Cardiovascular: Regular rate and rhythm  Abdomen: Soft, nontender, nondistended  Musculoskeletal: No clubbing, cyanosis, edema of bilateral lower extremities. Brisk capillary refill. Skin: Normal skin color. No rashes or lesions.   Neurologic:  Neurovascularly intact without any focal sensory/motor deficits. Cranial nerves: II-XII intact, grossly non-focal.  Psychiatric: Alert and oriented, thought content appropriate, normal insight    Reviewed EKG and CXR personally      CBC:   Recent Labs     06/14/22  2110 06/15/22  0541   WBC 7.6 6.0   RBC 4.55 4.39   HGB 13.9 13.5   HCT 42.4 41.9   MCV 93.2 95.4   RDW 13.3 13.4    229     BMP:   Recent Labs     06/14/22  2110 06/15/22  0541    138   K 4.5 4.1    105   CO2 27 24   BUN 17 15   CREATININE 0.9 0.8   MG  --  2.0     LFT:  Recent Labs     06/14/22  2110 06/15/22  0541   PROT 6.9 6.4   ALKPHOS 62 61   ALT 11 13   AST 16 15   BILITOT <0.2 0.5     CE:  No results for input(s): [place] in the last 72 hours. PT/INR: No results for input(s): INR, APTT in the last 72 hours. BNP: No results for input(s): BNP in the last 72 hours. ESR: No results found for: SEDRATE  CRP: No results found for: CRP  D Dimer: No results found for: DDIMER   Folate and B12: No results found for: [id number], No results found for: FOLATE  Lactic Acid: No results found for: LACTA  Thyroid Studies:   Lab Results   Component Value Date    TSH 1.750 06/15/2022       Oupatient labs:  Lab Results   Component Value Date    CHOL 159 06/15/2022    TRIG 153 (H) 06/15/2022    HDL 37 06/15/2022    LDLCALC 91 06/15/2022    TSH 1.750 06/15/2022    PSA SEE NOTE (AA) 01/25/2019    PSA <0.01 01/25/2019    INR 1.2 11/19/2013    LABA1C 5.4 07/22/2015       Urinalysis:  No results found for: NITRU, WBCUA, BACTERIA, RBCUA, BLOODU, SPECGRAV, GLUCOSEU    Imaging:  XR CHEST PORTABLE    Result Date: 6/14/2022  EXAMINATION: ONE XRAY VIEW OF THE CHEST 6/14/2022 8:39 pm COMPARISON: November 24, 2019 HISTORY: ORDERING SYSTEM PROVIDED HISTORY: left shoulder pain TECHNOLOGIST PROVIDED HISTORY: Reason for exam:->left shoulder pain FINDINGS: No airspace opacity or pleural effusion. The heart is normal size. No pneumothorax.  No free air beneath the hemidiaphragms. Multiple chronic right-sided rib fractures. No pneumonia or pleural effusion. NM Cardiac Stress Test Nuclear Imaging    Result Date: 6/15/2022  Pharmacologic myocardial perfusion stress test: Patient was injected with 10. [address] technetium sestamibi at rest. Resting images were obtained 45 minuets later. Patient received Regadenason after which 26. [address] technetium sestamibi was administered. Repeat images were obtained 45 minuets later. Raw spin images: The raw spin images revealed soft tissue diaphragmatic attenuation. PERFUSION IMAGES REVEALED: There is a small to medium size, medium intensity mid to distal and apical wall scar with mild periscar ischemia. Summed stress score 12 Summed rest score 8 Summed difference score 4 EJECTION FRACTION: 68%     1: There is a small to medium size, medium intensity mid to distal and apical wall scar with mild periscar ischemia 2  The estimated left ventricular ejection fraction is 68%. 3: Please see separate report for EKG and hemodynamic aspect of the stress test. 4: RISK SCAN: Intermediate risk scan     CT Lung Screen (Initial/Annual)    Result Date: 2022  EXAMINATION: LOW DOSE SCREENING CT OF THE CHEST WITHOUT CONTRAST 2022 7:35 am TECHNIQUE: Low dose lung cancer screening CT of the chest was performed without the administration of intravenous contrast.  Multiplanar reformatted images are provided for review. Automated exposure control, iterative reconstruction, and/or weight based adjustment of the mA/kV was utilized to reduce the radiation dose to as low as reasonably achievable. COMPARISON: None. HISTORY: ORDERING SYSTEM PROVIDED HISTORY: Personal history of tobacco use, presenting hazards to health TECHNOLOGIST PROVIDED HISTORY: Age: 71 y.o.  Smoking History: Social History Tobacco Use Smoking status: Former Smoker Packs/day: 1.00 Years: 30.00 Pack years: 27 Quit date: 2017 Years since quittin.3 Smokeless tobacco: Never Used Alcohol use: Yes Alcohol/week: 1.0 standard drink Types: 1 Cans of beer per week Drug use: No Pack years: 30 Last CT lung screen: 6/10/2021 Is there documentation of shared decision making? ->Yes Does the patient show any signs or symptoms of lung cancer? ->No Is this the first (baseline) CT or an annual exam?->Annual Is this a low dose CT or a routine CT?->Low Dose CT Smoking Status? ->Former Smoker Date quit smoking? (must be within 15 years)->1/1/17 Smoking packs per day?->1 Years smoking?->30 FINDINGS: Mediastinum: No abnormal lymphadenopathy. The pulmonary trunk is normal in size Lungs/pleura: Mild moderate paraseptal emphysema of the upper lobes, unchanged. No pulmonary nodules. No pleural effusions or pneumothorax. Upper Abdomen: No acute process identified Soft Tissues/Bones: No aggressive osseous lesions. No pulmonary nodules LUNG RADS: Per ACR Lung-RADS Version 1.1 Lung rads 1. Continue annual low-dose CT screening of the chest. RECOMMENDATIONS: If you would like to register your patient with the [employer] [facility] please contact the Nurse Navigator at [phone]. Unavailable       ASSESSMENT:  -Chest pain in adult  -Abnormal nuclear stress test  -Coronary artery disease  -Hypertension  -Hyperlipidemia      PLAN:  -Admit to medicine  -Consult cardiology  -Cardiac catheterization  -Aspirin, atorvastatin  -Telemetry  -Continue home medications        Diet: ADULT DIET;  Regular  Code Status: Prior  Surrogate decision maker confirmed with patient:   Extended Emergency Contact Information  Primary Emergency Contact: Becky Nuno  Address:   [facility], [address]  Home Phone: [phone]  Work Phone: [phone]  Relation: Spouse    DVT Prophylaxis: []Lovenox []Heparin []PCD [] [address] []Encouraged ambulation  Disposition: []Med/Surg [] Intermediate [] ICU/CCU  Admit status: [] Observation [] Inpatient +++++++++++++++++++++++++++++++++++++++++++++++++  Maple Lat, DO  +++++++++++++++++++++++++++++++++++++++++++++++++  NOTE: This report was transcribed using voice recognition software. Every effort was made to ensure accuracy; however, inadvertent computerized transcription errors may be present.

## 2022-06-16 NOTE — CARE COORDINATION
6/16/2022 0912  note: No covid testing. Per IDR, pt to be transferred to [facility] for cardiac catheterization when scheduled.  Jeronimo MUSA

## 2022-06-16 NOTE — CONSULTS
discomfort lasted longer, ~ 30 minutes before resolution --> patient became concerned and came to ED for further evaluation. The patient has admitted to a few brief episodes of the above described discomfort occurring at rest since hospital admission (resolved spontaneously). He is currently chest/left arm pain free. Denies associated shortness of breath at rest or on exertion, emesis, palpitations, dizziness, near-syncope or syncope. Denies PND, orthopnea or peripheral edema. States he has been on no home medications for \"some time now\", including antihypertensive therapy. Please note: past medical records were reviewed per electronic medical record (EMR) - see detailed reports under Past Medical/ Surgical History. PAST MEDICAL HISTORY:    Reported abnormal stress test 30+ years ago with subsequent cardiac catheterization by Dr. Drea Aden revealing angiographically normal coronary arteries per the patient (records not available for review)  Former tobacco smoker  GERD  HTN, not taking medical therapy   Chronic RBBB  Carotid artery disease  US in May 2021 revealed significant stenosis by velocity regarding bilateral external carotid arteries, although reported visually less  Reviewed by Dr. Shanika Schaffer, noted bilateral external carotid stenosis of 60%, and bilateral internal carotid stenosis of 30% upon physician review    PAST SURGICAL HISTORY:    Past Surgical History:   Procedure Laterality Date    CARDIAC CATHETERIZATION      CARDIOVASCULAR STRESS TEST N/A 06/15/2022    lexiscan stress test    JOINT REPLACEMENT      knee    PROSTATECTOMY  01/01/2009       HOME MEDICATIONS:  Prior to Admission medications    Medication Sig Start Date End Date Taking?  Authorizing Provider   pantoprazole (PROTONIX) 40 MG tablet Take 1 tablet by mouth every morning (before breakfast) 6/16/22  Yes Joe Pena,    nitroGLYCERIN (NITROSTAT) 0.4 MG SL tablet Place 1 tablet under the tongue every 5 minutes as needed for Chest pain up to max of 3 total doses. If no relief after 1 dose, call 911. 6/15/22  Yes Josee Seed, DO   lisinopril (PRINIVIL;ZESTRIL) 10 MG tablet Take 1 tablet by mouth daily 6/15/22  Yes Beverlie Seed, DO   naproxen (NAPROSYN) 500 MG tablet Take 500 mg by mouth daily as needed for Pain     Historical Provider, MD   aspirin 81 MG EC tablet Take 81 mg by mouth daily    Historical Provider, MD       CURRENT MEDICATIONS:      Current Facility-Administered Medications:     ketorolac (TORADOL) injection 15 mg, 15 mg, IntraVENous, Q6H PRN, Domingolie Seed, DO    lisinopril (PRINIVIL;ZESTRIL) tablet 10 mg, 10 mg, Oral, Daily, Beverlie Seed, DO, 10 mg at 06/15/22 1358    atorvastatin (LIPITOR) tablet 40 mg, 40 mg, Oral, Nightly, Ismail U Rasta, DO, 40 mg at 06/15/22 2146    aspirin EC tablet 81 mg, 81 mg, Oral, Daily, Domingolie Seed, DO, 81 mg at 06/15/22 0957    acetaminophen (TYLENOL) tablet 500 mg, 500 mg, Oral, Q6H PRN, Domingolie Seed, DO    pantoprazole (PROTONIX) tablet 40 mg, 40 mg, Oral, QAM AC, Sachin Lopez, DO    polyethylene glycol (GLYCOLAX) packet 17 g, 17 g, Oral, Daily PRN, Domingolie Seed, DO    prochlorperazine (COMPAZINE) injection 10 mg, 10 mg, IntraVENous, Q6H PRN, Domingolie Seed, DO      ALLERGIES:  Patient has no known allergies. SOCIAL HISTORY:    Lives with his wife, does not require assistance with ambulation. Former tobacco smoker, quit approximately 5 to 6 years ago. Smoked 2 pack/day for 40+ years. Rare, social alcohol use. Denies former/current illicit drug use    FAMILY HISTORY:   Non-contributory at this time due to patient's advanced age      REVIEW OF SYSTEMS:     Negative except as noted above in HPI      PHYSICAL EXAM:   /71   Pulse 57   Temp 97.7 °F (36.5 °C) (Infrared)   Resp 18   Ht 5' 11\" (1.803 m)   Wt 205 lb (93 kg)   SpO2 95%   BMI 28.59 kg/m²   CONST:  Well developed, well nourished WM who appears stated age.  Awake, alert, cooperative, no apparent distress. HEENT:   Head- Normocephalic, atraumatic. Eyes- Conjunctivae pink, anicteric. Neck-  No stridor, trachea midline, no apparent jugular venous distention. CHEST: Chest symmetrical and non-tender to palpation. No accessory muscle use or intercostal retractions. RESPIRATORY: Lung sounds - clear throughout fields. No wheezing, rales or rhonchi. CARDIOVASCULAR:     No noted carotid bruit. Heart Ausculation- Regular rate and rhythm, no significant murmur appreciated  PV: No significant lower extremity edema. Pedal pulses palpable, no clubbing or cyanosis. ABDOMEN: Soft, non-tender to light palpation. Bowel sounds present. MS: Good muscle strength and tone. No atrophy or abnormal movements. SKIN: Warm and dry. NEURO / PSYCH: Oriented to person, place and time. Speech clear and appropriate. Follows all commands. Pleasant affect. DATA:    Telemetry: SB with HR in the high 40s - 50s at rest    Diagnostic:  All diagnostic testing and lab work thus far this admission reviewed in detail. CXR 6/14/2022  Impression  No pneumonia or pleural effusion. Lexiscan Stress Test 6/15/2022  Impression  1: There is a small to medium size, medium intensity mid to distal and  apical wall scar with mild periscar ischemia  2  The estimated left ventricular ejection fraction is 68%.   3: Please see separate report for EKG and hemodynamic aspect of the  stress test.  4: RISK SCAN: Intermediate risk scan      No intake or output data in the 24 hours ending 06/16/22 0743    Labs:   CBC:   Recent Labs     06/14/22  2110 06/15/22  0541   WBC 7.6 6.0   HGB 13.9 13.5   HCT 42.4 41.9    229     BMP:   Recent Labs     06/14/22  2110 06/15/22  0541    138   K 4.5 4.1   CO2 27 24   BUN 17 15   CREATININE 0.9 0.8   LABGLOM >60 >60   CALCIUM 9.1 8.7     Mag:   Recent Labs     06/15/22  0541   MG 2.0     TSH:   Recent Labs     06/15/22  0541   TSH 1.750     HgA1c:   Lab Results Component Value Date    LABA1C 5.4 07/22/2015     FASTING LIPID PANEL:  Lab Results   Component Value Date    CHOL 159 06/15/2022    HDL 37 06/15/2022    LDLCALC 91 06/15/2022    TRIG 153 06/15/2022     LIVER PROFILE:  Recent Labs     06/14/22  2110 06/15/22  0541   AST 16 15   ALT 11 13   LABALBU 4.3 4.1      Ref Range & Units 06/14/22 2211 06/14/22 2110   Troponin, High Sensitivity 0 - 11 ng/L 19 High   18 High  CM       Ref Range & Units 06/14/22 2110   Pro-BNP 0 - 125 pg/mL 36        ASSESSMENT:  Chest pain, concerning for angina  cRBBB on EKG  hS-troponin 18 --> 19  Lexiscan stress test this admission revealing \"small to medium sized, medium intensity mid to distal apical wall scar with mild periscar ischemia\". EF 60% on stress test, intermediate risk scan. Reported normal cardiac catheterization approximately 30 years ago  Uncontrolled hypertension, likely in setting of medication non-compliance  Newly diagnosed HLD  Former tobacco smoker  Carotid artery disease  GERD      RECOMMENDATIONS:  Agree with aspirin and statin therapy  No BB at this time due to HR in the high 40s to 50s  Lisinopril has been re-initiated by admitting team, further antihypertensive therapy as per primary service  Patient requesting further cardiac evaluation / management by his primary cardiologist Dr. Terrence Byrne at this time. I discussed the case in detail with Dr. Terrence Byrne via telephone, with plan for cardiac catheterization to be set up/scheduled by the patient's primary cardiologist.  Cath Lab at [facility] is aware --> they have been contacted by Dr. Terrence Byrne, with plans for cardiac catheterization by him later today. Patient to eat early breakfast, NPO afterward.   Further cardiac management/care as per patient's primary cardiologist  [facility] cardiology will sign off at this time, please call with any questions or concerns    Above was discussed in detail with nursing staff        The above case and recommendations have been discussed and made in collaboration with Dr. Tana Gonzales. Further recommendations to follow as per him. NOTE: This report was transcribed using voice recognition software. Every effort was made to ensure accuracy; however, inadvertent computerized transcription errors may be present. Elsi Tillman, [address] [place] [address], [place] [address] Cardiology    Electronically signed by Kinza Estrada PA-C on 6/16/2022 at 7:43 AM     Patient seen and examined and case discussed in detail with cardiology nurse practitioner/PA. Agree with assessment and plan and history and physical examination discussed in detail and documented above. I also independently examined the patient and reviewed the chart for blood work and images and changes in management. I contributed more than 50% of the patient care.

## 2022-06-16 NOTE — DISCHARGE SUMMARY
Department of Internal Medicine        CHIEF COMPLAINT:  CP    Reason for Admission:  Chest pain-possible ACS    HISTORY OF PRESENT ILLNESS:      The patient is a 71 y.o. male who presents with mid left-sided chest pain along with pain rating to left arm. Patient's discomfort started acutely on 6/13. Patient was mowing his lawn when the pain started. Patient states that the pain is sharp and intermittent with radiation to the elbow and neck. The pain is worse with exertion. Patient also has some tingling sensation in his left arm. He was having cold sweats yesterday. Patient denied any other problems such as nausea/vomiting, fever/chills, dizziness or syncope. Patient does have a chronic cough with history of prior tobacco abuse. 6/16/2022  Patient seen examined on telemetry floor. The stress portion was unremarkable yesterday but the nuclear images showed small to medium size medium intensity mid to distal and apical wall scar with mild periscar or ischemia. The EF was 68%. Temperature 97.7 with heart rate of 63 and blood pressure 162/54. O2 sat 93% room air at rest.  The patient follows up with Dr. Missy Good outpatient and he was consulted. Patient will be transfered to [facility] for heart catheterization.   Patient stable for transfer    Past Medical History:    Past Medical History:   Diagnosis Date    Arthritis     Bilateral carotid artery stenosis 6/3/2021    Cancer [facility])     prostate 2009    DILLAN (cerebral atherosclerosis)     History of tobacco use 6/3/2021    Hypercholesterolemia     Stenosis of both external carotid arteries 6/3/2021     Past Surgical History:    Past Surgical History:   Procedure Laterality Date    CARDIAC CATHETERIZATION      CARDIOVASCULAR STRESS TEST N/A 06/15/2022    lexiscan stress test    JOINT REPLACEMENT      knee    PROSTATECTOMY  01/01/2009       Medications Prior to Admission:    @  Prior to Admission medications    Medication Sig Start Date End Date Taking? Authorizing Provider   pantoprazole (PROTONIX) 40 MG tablet Take 1 tablet by mouth every morning (before breakfast) 22  Yes Ghazal Line, DO   nitroGLYCERIN (NITROSTAT) 0.4 MG SL tablet Place 1 tablet under the tongue every 5 minutes as needed for Chest pain up to max of 3 total doses. If no relief after 1 dose, call 911. 6/15/22  Yes Ghazal Line, DO   lisinopril (PRINIVIL;ZESTRIL) 10 MG tablet Take 1 tablet by mouth daily 6/15/22  Yes Ghazal Line, DO   naproxen (NAPROSYN) 500 MG tablet Take 500 mg by mouth daily as needed for Pain     Historical Provider, MD   aspirin 81 MG EC tablet Take 81 mg by mouth daily    Historical Provider, MD       Allergies:  Patient has no known allergies. Social History:   Social History     Socioeconomic History    Marital status:      Spouse name: Not on file    Number of children: Not on file    Years of education: Not on file    Highest education level: Not on file   Occupational History    Not on file   Tobacco Use    Smoking status: Former Smoker     Packs/day: 1.00     Years: 30.00     Pack years: 30.00     Quit date: 2017     Years since quittin.4    Smokeless tobacco: Never Used   Substance and Sexual Activity    Alcohol use: Yes     Alcohol/week: 1.0 standard drink     Types: 1 Cans of beer per week    Drug use: No    Sexual activity: Not on file     Comment: N/A   Other Topics Concern    Not on file   Social History Narrative    Not on file     Social Determinants of Health     Financial Resource Strain:     Difficulty of Paying Living Expenses: Not on file   Food Insecurity:     Worried About Running Out of Food in the Last Year: Not on file    Johanna of Food in the Last Year: Not on file   Transportation Needs:     Lack of Transportation (Medical): Not on file    Lack of Transportation (Non-Medical):  Not on file   Physical Activity:     Days of Exercise per Week: Not on file    Minutes of Exercise per Session: Not on file   Stress:     Feeling of Stress : Not on file   Social Connections:     Frequency of Communication with Friends and Family: Not on file    Frequency of Social Gatherings with Friends and Family: Not on file    Attends Anglican Services: Not on file    Active Member of Clubs or Organizations: Not on file    Attends Club or Organization Meetings: Not on file    Marital Status: Not on file   Intimate Partner Violence:     Fear of Current or Ex-Partner: Not on file    Emotionally Abused: Not on file    Physically Abused: Not on file    Sexually Abused: Not on file   Housing Stability:     Unable to Pay for Housing in the Last Year: Not on file    Number of [place] in the Last Year: Not on file    Unstable Housing in the Last Year: Not on file       Family History:   Family History   Problem Relation Age of Onset    Cancer Mother         bladder    Cancer Father         nose melanoma    Mult Sclerosis Sister        REVIEW OF SYSTEMS:    Gen: Patient denies any lightheadedness or dizziness. No LOC or syncope. No fevers or chills. HEENT: No earache, sore throat or nasal congestion. Resp: Chronic cough,no hemoptysis or sputum production. Cardiac: + chest pain, SOB,no diaphoresis or palpitations. GI: No nausea, vomiting, diarrhea or constipation. No melena or hematochezia. : No urinary complaints, dysuria, hematuria or frequency. MSK: No extremity weakness, paralysis or paresthesias. PHYSICAL EXAM:    Vitals:  BP (!) 162/54   Pulse 63   Temp 97.7 °F (36.5 °C) (Oral)   Resp 18   Ht 5' 11\" (1.803 m)   Wt 205 lb (93 kg)   SpO2 93%   BMI 28.59 kg/m²     General:  This is a 71 y.o. yo male who is alert and oriented in NAD  HEENT:  Head is normocephalic and atraumatic, PERRLA, EOMI, mucus membranes moist with no pharyngeal erythema or exudate. Neck:  Supple with no carotid bruits, JVD or thyromegaly.   No cervical adenopathy  CV:  Regular O2SAT  HgBA1c:    Lab Results   Component Value Date    LABA1C 5.4 07/22/2015     FLP:    Lab Results   Component Value Date    TRIG 153 06/15/2022    HDL 37 06/15/2022    LDLCALC 91 06/15/2022    LABVLDL 31 06/15/2022     TSH:    Lab Results   Component Value Date    TSH 1.750 06/15/2022     IRON:  No results found for: IRON  LIPASE:  No results found for: LIPASE    ASSESSMENT AND PLAN:      Patient Active Problem List    Diagnosis Date Noted    Chest pain with high risk for cardiac etiology 06/14/2022    Chest pain 06/14/2022    Stenosis of both external carotid arteries 06/03/2021    Bilateral carotid artery stenosis 06/03/2021    History of tobacco use 06/03/2021    Chest discomfort 11/19/2013     Impression:  1. Chest pain and L arm pain-+ positive IV Lexiscan nuclear images for possible acute ischemic event inferior wall  2. Hx mild -mod carotid artery dx  3. Hx prior tobacco abuse  4. Hx prostate Ca-2009  5. HLD  6.   Elevated blood pressure    Plan:  Transfer to [facility] for heart catheterization    Lisinopril 10 mg p.o. daily  Aspirin 81 mg p.o. daily  Lipitor 40 mg p.o. daily    Lalita Niño DO, DJESSIE  6/16/2022  9:22 AM

## 2022-06-17 VITALS
DIASTOLIC BLOOD PRESSURE: 64 MMHG | SYSTOLIC BLOOD PRESSURE: 138 MMHG | RESPIRATION RATE: 18 BRPM | OXYGEN SATURATION: 97 % | HEART RATE: 64 BPM | TEMPERATURE: 98 F

## 2022-06-17 PROBLEM — E78.00 PURE HYPERCHOLESTEROLEMIA WITH TARGET LOW DENSITY LIPOPROTEIN (LDL) CHOLESTEROL LESS THAN 70 MG/DL: Chronic | Status: ACTIVE | Noted: 2022-06-17

## 2022-06-17 PROBLEM — I24.9 ACUTE CORONARY SYNDROME (HCC): Status: ACTIVE | Noted: 2022-06-17

## 2022-06-17 PROBLEM — I71.43 ANEURYSM OF INFRARENAL ABDOMINAL AORTA (HCC): Chronic | Status: ACTIVE | Noted: 2022-06-17

## 2022-06-17 PROBLEM — Z95.5 S/P CORONARY ARTERY STENT PLACEMENT: Status: ACTIVE | Noted: 2022-06-17

## 2022-06-17 LAB
ALBUMIN SERPL-MCNC: 4.3 G/DL (ref 3.5–5.2)
ALP BLD-CCNC: 72 U/L (ref 40–129)
ALT SERPL-CCNC: 17 U/L (ref 0–40)
ANION GAP SERPL CALCULATED.3IONS-SCNC: 10 MMOL/L (ref 7–16)
AST SERPL-CCNC: 16 U/L (ref 0–39)
BASOPHILS ABSOLUTE: 0.05 E9/L (ref 0–0.2)
BASOPHILS RELATIVE PERCENT: 0.7 % (ref 0–2)
BILIRUB SERPL-MCNC: 0.5 MG/DL (ref 0–1.2)
BUN BLDV-MCNC: 14 MG/DL (ref 6–23)
CALCIUM SERPL-MCNC: 9.1 MG/DL (ref 8.6–10.2)
CHLORIDE BLD-SCNC: 105 MMOL/L (ref 98–107)
CO2: 26 MMOL/L (ref 22–29)
CREAT SERPL-MCNC: 0.9 MG/DL (ref 0.7–1.2)
EKG ATRIAL RATE: 62 BPM
EKG P AXIS: 62 DEGREES
EKG P-R INTERVAL: 192 MS
EKG Q-T INTERVAL: 446 MS
EKG QRS DURATION: 152 MS
EKG QTC CALCULATION (BAZETT): 452 MS
EKG R AXIS: 82 DEGREES
EKG T AXIS: 44 DEGREES
EKG VENTRICULAR RATE: 62 BPM
EOSINOPHILS ABSOLUTE: 0.15 E9/L (ref 0.05–0.5)
EOSINOPHILS RELATIVE PERCENT: 2 % (ref 0–6)
GFR AFRICAN AMERICAN: >60
GFR NON-AFRICAN AMERICAN: >60 ML/MIN/1.73
GLUCOSE BLD-MCNC: 102 MG/DL (ref 74–99)
HCT VFR BLD CALC: 44.3 % (ref 37–54)
HEMOGLOBIN: 15.2 G/DL (ref 12.5–16.5)
IMMATURE GRANULOCYTES #: 0.04 E9/L
IMMATURE GRANULOCYTES %: 0.5 % (ref 0–5)
LYMPHOCYTES ABSOLUTE: 1.46 E9/L (ref 1.5–4)
LYMPHOCYTES RELATIVE PERCENT: 19.5 % (ref 20–42)
MCH RBC QN AUTO: 32.4 PG (ref 26–35)
MCHC RBC AUTO-ENTMCNC: 34.3 % (ref 32–34.5)
MCV RBC AUTO: 94.5 FL (ref 80–99.9)
MONOCYTES ABSOLUTE: 0.63 E9/L (ref 0.1–0.95)
MONOCYTES RELATIVE PERCENT: 8.4 % (ref 2–12)
NEUTROPHILS ABSOLUTE: 5.15 E9/L (ref 1.8–7.3)
NEUTROPHILS RELATIVE PERCENT: 68.9 % (ref 43–80)
PDW BLD-RTO: 13.4 FL (ref 11.5–15)
PLATELET # BLD: 248 E9/L (ref 130–450)
PMV BLD AUTO: 10.1 FL (ref 7–12)
POTASSIUM REFLEX MAGNESIUM: 4.5 MMOL/L (ref 3.5–5)
POTASSIUM SERPL-SCNC: 4.5 MMOL/L (ref 3.5–5)
RBC # BLD: 4.69 E12/L (ref 3.8–5.8)
SODIUM BLD-SCNC: 141 MMOL/L (ref 132–146)
TOTAL PROTEIN: 7.1 G/DL (ref 6.4–8.3)
WBC # BLD: 7.5 E9/L (ref 4.5–11.5)

## 2022-06-17 PROCEDURE — 027034Z DILATION OF CORONARY ARTERY, ONE ARTERY WITH DRUG-ELUTING INTRALUMINAL DEVICE, PERCUTANEOUS APPROACH: ICD-10-PCS | Performed by: FAMILY MEDICINE

## 2022-06-17 PROCEDURE — 6370000000 HC RX 637 (ALT 250 FOR IP): Performed by: FAMILY MEDICINE

## 2022-06-17 PROCEDURE — 80053 COMPREHEN METABOLIC PANEL: CPT

## 2022-06-17 PROCEDURE — 80048 BASIC METABOLIC PNL TOTAL CA: CPT

## 2022-06-17 PROCEDURE — B2151ZZ FLUOROSCOPY OF LEFT HEART USING LOW OSMOLAR CONTRAST: ICD-10-PCS | Performed by: FAMILY MEDICINE

## 2022-06-17 PROCEDURE — 36415 COLL VENOUS BLD VENIPUNCTURE: CPT

## 2022-06-17 PROCEDURE — 4A023N7 MEASUREMENT OF CARDIAC SAMPLING AND PRESSURE, LEFT HEART, PERCUTANEOUS APPROACH: ICD-10-PCS | Performed by: FAMILY MEDICINE

## 2022-06-17 PROCEDURE — 6370000000 HC RX 637 (ALT 250 FOR IP): Performed by: INTERNAL MEDICINE

## 2022-06-17 PROCEDURE — 85025 COMPLETE CBC W/AUTO DIFF WBC: CPT

## 2022-06-17 PROCEDURE — 2580000003 HC RX 258: Performed by: FAMILY MEDICINE

## 2022-06-17 PROCEDURE — B2111ZZ FLUOROSCOPY OF MULTIPLE CORONARY ARTERIES USING LOW OSMOLAR CONTRAST: ICD-10-PCS | Performed by: FAMILY MEDICINE

## 2022-06-17 RX ORDER — PRASUGREL 10 MG/1
10 TABLET, FILM COATED ORAL DAILY
Qty: 90 TABLET | Refills: 3 | Status: SHIPPED | OUTPATIENT
Start: 2022-06-18

## 2022-06-17 RX ORDER — ROSUVASTATIN CALCIUM 20 MG/1
20 TABLET, COATED ORAL DAILY
Qty: 30 TABLET | Refills: 3 | Status: SHIPPED | OUTPATIENT
Start: 2022-06-18

## 2022-06-17 RX ADMIN — ASPIRIN 81 MG: 81 TABLET, COATED ORAL at 10:58

## 2022-06-17 RX ADMIN — PANTOPRAZOLE SODIUM 40 MG: 40 TABLET, DELAYED RELEASE ORAL at 06:32

## 2022-06-17 RX ADMIN — LISINOPRIL 10 MG: 10 TABLET ORAL at 10:58

## 2022-06-17 RX ADMIN — PRASUGREL 10 MG: 10 TABLET, FILM COATED ORAL at 10:58

## 2022-06-17 RX ADMIN — ROSUVASTATIN 20 MG: 20 TABLET, FILM COATED ORAL at 10:58

## 2022-06-17 RX ADMIN — SODIUM CHLORIDE, PRESERVATIVE FREE 10 ML: 5 INJECTION INTRAVENOUS at 10:58

## 2022-06-17 ASSESSMENT — PAIN SCALES - GENERAL: PAINLEVEL_OUTOF10: 0

## 2022-06-17 NOTE — PROCEDURES
[address]                  [address] [facility],  [facility]                            CARDIAC CATHETERIZATION    PATIENT NAME: Nithya Guzman                     :        1950  MED REC NO:   [id number]                            ROOM:       56  ACCOUNT NO:   [de-identified]                           ADMIT DATE: 2022  PROVIDER:     Aric De La Fuente DO    DATE OF PROCEDURE:  2022    ELOISE LEFT CARDIAC CATHETERIZATION AND PCI TO FOLLOW    SCAI indicator for cardiac catheterization 4 and 8. For angioplasty 4. PCI 16 and 7. PRESSURES:  /62, mean 102, /13. SUMMARY OF INJECTIONS. II. Selective coronary arteriogram.  a. Right coronary artery - preponderant. A moderate caliber vessel  with 20% ostial narrowing followed by a 15% segmental narrowing seen  after the vergara's crook. The distal vessel had mild luminal wall  irregularities. b.  Left coronary artery. 1.  Left main trunk - normal.  2.  Left anterior descending, a highly eccentric 85%-90% segmental  narrowing seen at the level of the first diagonal branch. The distal  anterior descending artery had mild luminal wall irregularities. In the  proximal second diagonal branch, there is 25% segmental narrowing. 3.  Circumflex is nondominant, a moderate caliber vessel with 15%  segmental narrowing noted before the takeoff of posterolateral marginal  branches. II. Left ventricular angiogram.  Left ventricular cavity size is normal  with normal contractility. Estimated left ventricular ejection 65%. IV. Abdominal aortogram.  The abdominal aorta demonstrates wide patency  of the renal arteries; however, beneath this level, there is a saccular  aneurysm noted. The distal runoff was adequate with widely patent  common iliacs. CONCLUSIONS:  1. Severely stenotic native coronary atherosclerosis. 2.  Normal left ventricular systolic function.   3.  Saccular infrarenal abdominal aneurysm. CORONARY ARTERY INTERVENTION REPORT    SCAI indicator 16, score of 7. CLINICAL HISTORY:  This 58-year-old gentleman presented to [address] [place] [address] following a history of rapidly increasing effort  angina-like symptoms to the point where he noted it at rest.  He was  felt to have an acute coronary syndrome with an acute non-ST elevation  myocardial infarction and underwent a nuclear stress test at [address] demonstrating an area of ischemia at the anterior  apical segment. Having reviewed the indications, risks, and benefits of  cardiac catheterization and potential intervention, the patient agreed  to proceed. DESCRIPTION OF PROCEDURE:  Following initial cardiac catheterization, a  6-Irish introducer sheath was passed over a guidewire within the right  common femoral artery. Some difficulty was encountered in passing the  wire past the mid abdomen, but fortunately, was able to traverse the mid  abdominal aorta. Next, an FL-4 guiding catheter was advanced over a  guidewire to the aortic root where injections were made of the left  coronary artery in the ORELLANA craniocaudal projection demonstrating the  85%-90% segmental narrowing in the proximal anterior descending artery  and for this reason, coronary artery intervention was initiated preceded  with injection of a bolus of bivalirudin followed by a drip. A PT2  angled exchange wire was passed to the distal anterior descending artery  followed by 2.5 mm x 12 mm balloon, which was inflated on two occasions  to as high as 8 atmospheres. Repeat injections demonstrated mild  persistent residual narrowing and for this reason, a 3 mm x 22 mm  drug-eluting stent was deployed at 17 atmospheres and repeat injections  now revealed 0% residual narrowing without compromise of the diagonal  branch.   For this reason, the guiding catheter was removed and adequate  hemostasis accomplished following deployment of a 6-Turkmen ExoSeal  device. There were no complications noted. CONCLUSIONS:  1. Successful coronary artery intervention with drug-eluting stent to  proximal left anterior descending artery. CONSCIOUS SEDATION:  Utilizing intravenous fentanyl and midazolam.   Conscious sedation was initiated at 17:53 and completed at 18:30. CONTRAST UTILIZED:  150 mL. FLUOROSCOPY TIME:  9 minutes. ESTIMATED BLOOD LOSS:  Less than 10 mL.         [facility]    D: 06/17/2022 12:39:29       T: 06/17/2022 12:44:29     [email or web address]  Job#: [id number]     Doc#: [id number]    CC:

## 2022-06-17 NOTE — PROGRESS NOTES
CLINICAL PHARMACY NOTE: MEDS TO BEDS    Total # of Prescriptions Filled: 2   The following medications were delivered to the patient:  · Rosuvastatin calcium 20 mg  · prasugrel 10 mg  · Delivered to pt @ 3:40p    Additional Documentation:

## 2022-06-17 NOTE — CONSULTS
Met with patient and discussed that their physician has ordered a referral to our outpatient Phase II Cardiac Rehabilitation program. Reviewed the benefits of cardiac rehabilitation based on their diagnosis and personal risk factors. Patient demonstrates moderate interest in Cardiac Rehabilitation at this time. Cardiac Rehabilitation brochure provided to patient/family. The Cardiac Rehabilitation Program has been provided the patient's referral information and pertinent patient details and history. The patient may call [facility] at [phone] for additional information or questions. Contact information for [facility] and other choices close to the patient's residence have been provided in the discharge instructions so that the patient may call and schedule an appointment when cleared by their physician.  Thank you for the referral.

## 2022-06-17 NOTE — PROGRESS NOTES
PROGRESS NOTE       PATIENT PROBLEM LIST:  Patient Active Problem List   Diagnosis Code    Chest discomfort R07.89    Stenosis of both external carotid arteries I65.23    Bilateral carotid artery stenosis I65.23    History of tobacco use Z87.891    Chest pain with high risk for cardiac etiology R07.9    Chest pain R07.9    CAD in native artery I25.10    Aneurysm of infrarenal abdominal aorta (HCC) I71.4    Acute coronary syndrome ([facility] [address] I24.9    S/P coronary artery stent placement Z95.5    Hypercholesterolemia with target low density lipoprotein (LDL) cholesterol less than 70 mg/dL E78.00       SUBJECTIVE:  Thierry Nuno states he feels fine and is fully ambulatory in the hallway without any recurrent symptoms of angina pectoris. Likewise he notes some mild soreness in his right groin site but no pain or swelling. He is anxious to be discharged. REVIEW OF SYSTEMS:  General ROS: negative for - fatigue, malaise,  weight gain or weight loss  Psychological ROS: negative for - anxiety , depression  Ophthalmic ROS: negative for - decreased vision or visual distortion. ENT ROS: negative  Allergy and Immunology ROS: negative  Hematological and Lymphatic ROS: negative  Endocrine: no heat or cold intolerance and no polyphagia, polydipsia, or polyuria  Respiratory ROS: negative for - pleuritic pain, shortness of breath and wheezing  Cardiovascular ROS: positive for - chest painresolved. Gastrointestinal ROS: no abdominal pain, change in bowel habits, or black or bloody stools  Genito-Urinary ROS: no nocturia, dysuria, trouble voiding, frequency or hematuria  Musculoskeletal ROS: negative for- myalgias, arthralgias, or claudication  Neurological ROS: no TIA or stroke symptoms otherwise no significant change in symptoms or problems since yesterday as documented in previous progress notes.     SCHEDULED MEDICATIONS:   aspirin  81 mg Oral Daily    lisinopril  10 mg Oral Daily    pantoprazole  40 mg Oral QAM AC  sodium chloride flush  10 mL IntraVENous 2 times per day    [Held by provider] enoxaparin  40 mg SubCUTAneous Daily    sodium chloride flush  5-40 mL IntraVENous 2 times per day    rosuvastatin  20 mg Oral Daily    prasugrel  10 mg Oral Daily       VITAL SIGNS:                                                                                                                          BP (!) 159/72   Pulse 68   Temp 98 °F (36.7 °C) (Temporal)   Resp 18   SpO2 97%   No data found. OBJECTIVE:    HEENT: PERRL, EOM  Intact; sclera non-icteric, conjunctiva pink. Carotids are brisk in upstroke with normal contour. No carotid bruits. Normal jugular venous pulsation at 45°. No palpable cervical nor supraclavicular nodes. Thyroid not palpable. Trachea midline. Chest: Even excursion  Lungs: CTA B, no expiratory wheezes or rhonchi, no decreased tactile fremitus without inspiratory rales. Heart: Regular  rhythm; S1 > S2, no gallop or murmur. No clicks, rub, palpable thrills   or heaves. PMI nondisplaced, 5th intercostal space MCL. Abdomen: Soft, nontender, nondistended,  moderately protuberant, no masses or organomegaly. Bowel sounds active. Extremities: Without clubbing, cyanosis or edema. Pulses present 3+ upper extermities bilaterally; present 1+ DP and present 1+ PT bilaterally.      Data:   Scheduled Meds: Reviewed  Continuous Infusions:    sodium chloride      sodium chloride         Intake/Output Summary (Last 24 hours) at 6/17/2022 1304  Last data filed at 6/17/2022 1001  Gross per 24 hour   Intake 360 ml   Output 300 ml   Net 60 ml     CBC:   Recent Labs     06/14/22  2110 06/15/22  0541 06/17/22  0658   WBC 7.6 6.0 7.5   HGB 13.9 13.5 15.2   HCT 42.4 41.9 44.3    229 248     BMP:  Recent Labs     06/14/22  2110 06/14/22  2110 06/15/22  0541 06/15/22  0541 06/17/22  0658     --  138  --  141   K 4.5  --  4.1  --  4.5  4.5     --  105  --  105   CO2 27  --  24  --  26   BUN 17  --  15 --  14   CREATININE 0.9  --  0.8  --  0.9   LABGLOM >60   < > >60   < > >60    < > = values in this interval not displayed. ABGs: No results found for: PH, PO2, PCO2  INR: No results for input(s): INR in the last 72 hours. PRO-BNP:   Lab Results   Component Value Date    PROBNP 36 06/14/2022    PROBNP 36 11/24/2019      TSH:   Lab Results   Component Value Date    TSH 1.750 06/15/2022      Cardiac Injury Profile:   Recent Labs     06/14/22  2110 06/14/22  2211   TROPHS 18* 19*      Lipid Profile:   Lab Results   Component Value Date    TRIG 153 06/15/2022    HDL 37 06/15/2022    LDLCALC 91 06/15/2022    CHOL 159 06/15/2022      Hemoglobin A1C: No components found for: HGBA1C      RAD:   No orders to display         EKG: See Report  Echo: See Report      IMPRESSIONS:  Patient Active Problem List   Diagnosis Code    Chest discomfort R07.89    Stenosis of both external carotid arteries I65.23    Bilateral carotid artery stenosis I65.23    History of tobacco use Z87.891    Chest pain with high risk for cardiac etiology R07.9    Chest pain R07.9    CAD in native artery I25.10    Aneurysm of infrarenal abdominal aorta (HCC) I71.4    Acute coronary syndrome (HCC) I24.9    S/P coronary artery stent placement Z95.5    Hypercholesterolemia with target low density lipoprotein (LDL) cholesterol less than 70 mg/dL E78.00       RECOMMENDATIONS:  Troponin poco will be discharged today following successful coronary artery intervention to the proximal left anterior descending artery. He was counseled on smoking cessation and have encouraged him to follow-up with his primary physician, Ren Veloz DO and I will see him again in 3 months or sooner as clinically warranted. Fortunately his left ventricular systolic function was normal with estimated LVEF 65±5%. Likewise he is to remain on rosuvastatin with an LDL goal achieving updated 2020 ACC/AHA/AACE/ESC/EAS cholesterol guidelines.     I have spent more than 27 minutes face to face with Gabi Leonard and reviewing notes and laboratory data, with greater than 50% of this time instructing and counseling the patient and his wife face to face regarding my findings and recommendations and I have answered all questions as posed to me by Mr. Victor M Shea, DO FACP,FACC,[facility]      NOTE:  This report was transcribed using voice recognition software.   Every effort was made to ensure accuracy; however, inadvertent computerized transcription errors may be present

## 2022-06-17 NOTE — PROGRESS NOTES
[facility] Progress Note    Admitting Date and Time: 6/16/2022  6:50 PM  Admit Dx: CAD in native artery [I25.10]     Mr. Hermann Pride is a 71years old male with PMH of carotid artery stenosis, CAD, HCC cancer, CVA, tobacco abuse, HLD, presented to ER with complaint of left-sided chest pain radiating towards left arm after movwng his lawn, aggravated by exertion along with some tingling sensation in his left arm and cold sweating but denies any diaphoresis, dizziness, nausea, vomiting. In ER, his vitals were stable. Labs were unremarkable. Patient was admitted for further cardiac work-up and cardiology evaluation. Nuclear stress test showed left EF 68%, small to medium sized, medium intensity mid to distal apical wall scar with mild periscar ischemia\". Cardiology was consulted and patient underwent cardiac cath with stent placement on 6/16/2022. Cardiac rehab has been consulted as well. Waiting for cardiology for further recommendations. Subjective:    Patient was seen at the bedside, stable with no new complaints.     ROS:   Constitutional: Denies fever, chills, generalized weakness, Weight loss, changes in appetite  Eye: Denies visual loss, blurriness of vision, photophobia, discharge, redness  ENT: Denies ear discharge, ear pain, epistaxis, throat swelling, tongue swelling, dysphagia  Respiratory: Denies cough, shortness of breath, pleuritic chest pain, hemoptysis, PORTILLO  Cardiac: Denies chest pain, palpitations, edema, PND, orthopnea, PORTILLO  GI: Denies abdominal pain, nausea, vomiting, diarrhea, constipation, hematemesis, hematochezia, melena, abdominal distention  : Denies flank pain, burning urination, dysuria,  suprapubic discomfort hematuria  Neuro: Denies headache, neck pain, tingling, numbness, seizure, LOC, confusion, paralysis or paresis, bladder dysfunction, bowel dysfunction  Musculoskeletal: Denies arthralgia, myalgia, arthritis of any joint  Hematology/immunology: Denies bleeding, bruising  Psych: Denies suicidal ideation, homicidal ideation, visual/auditory hallucinations         aspirin  81 mg Oral Daily    lisinopril  10 mg Oral Daily    pantoprazole  40 mg Oral QAM AC    sodium chloride flush  10 mL IntraVENous 2 times per day    [Held by provider] enoxaparin  40 mg SubCUTAneous Daily    sodium chloride flush  5-40 mL IntraVENous 2 times per day    rosuvastatin  20 mg Oral Daily    prasugrel  10 mg Oral Daily     sodium chloride flush, 10 mL, PRN  sodium chloride, , PRN  promethazine, 12.5 mg, Q6H PRN   Or  ondansetron, 4 mg, Q6H PRN  polyethylene glycol, 17 g, Daily PRN  acetaminophen, 650 mg, Q6H PRN   Or  acetaminophen, 650 mg, Q6H PRN  sodium chloride flush, 5-40 mL, PRN  sodium chloride, , PRN  acetaminophen, 650 mg, Q4H PRN         Objective:    BP (!) 159/72   Pulse 68   Temp 98 °F (36.7 °C) (Temporal)   Resp 18   SpO2 97%     General Appearance: alert and oriented to person, place and time and in no acute distress  Skin: warm and dry  Head: normocephalic and atraumatic  Eyes: pupils equal, round, and reactive to light, extraocular eye movements intact, conjunctivae normal  Neck: neck supple and non tender without mass   Pulmonary/Chest: clear to auscultation bilaterally- no wheezes, rales or rhonchi, normal air movement, no respiratory distress  Cardiovascular: normal rate, normal S1 and S2 and no carotid bruits  Abdomen: soft, non-tender, non-distended, normal bowel sounds, no masses or organomegaly  Extremities: no cyanosis, no clubbing and no edema  Neurologic: no cranial nerve deficit and speech normal        Recent Labs     06/14/22  2110 06/15/22  0541 06/17/22  0658    138 141   K 4.5 4.1 4.5  4.5    105 105   CO2 27 24 26   BUN 17 15 14   CREATININE 0.9 0.8 0.9   GLUCOSE 108* 98 102*   CALCIUM 9.1 8.7 9.1       Recent Labs     06/14/22  2110 06/15/22  0541 06/17/22  0658   WBC 7.6 6.0 7.5   RBC 4.55 4.39 4.69   HGB 13.9 13.5 15.2   HCT 42.4 41.9 44.3   MCV 93.2 95.4 94.5   MCH 30.5 30.8 32.4   MCHC 32.8 32.2 34.3   RDW 13.3 13.4 13.4    229 248   MPV 9.9 10.3 10.1       Radiology:   Nuclear stress test :  left EF 68%, small to medium sized, medium intensity mid to distal apical wall scar with mild periscar ischemia    Assessment:    Principal problem  Coronary artery disease  Patient, presented with chest pain  S/p cardiac cath and stenting  Continue aspirin, Crestor, Prasugrel  Continue telemetry  Follow-up cardiology    HTN , benign  Continue lisinopril  Monitor BP  DASH Diet          NOTE: This repoCoronary artery diseasert was transcribed using voice recognition software. Every effort was made to ensure accuracy; however, inadvertent computerized transcription errors may be present.   Electronically signed by René Cota MD on 6/17/2022 at 12:35 PM

## 2022-06-17 NOTE — CARE COORDINATION
6/17, Patient was a transfer from [facility]. Patient was admitted for CAD in native artery. Per Cardiology patient was to have a coronary artery intervention. Met with patient by bedside and wife in room to discuss discharge plan and SW role. Patient lives with wife and says he is independent with ADL's and still drives. PCP is Dr. Anastasia Villanueva and Pharmacy is [facility] in [place]. No needs identified at this time and patient says he is ready for discharge. Wife to be transportation. SW to follow for further discharge planning needs.       NATALIA Mae  [facility] Case Management  [phone]

## 2022-06-17 NOTE — DISCHARGE SUMMARY
[facility] Physician Discharge Summary       No follow-up provider specified. Activity level: As tolerated     Dispo: Home    Condition on discharge: Stable    Patient ID:  Tonny Aleman  [id number]  46 y.o.  1950    Admit date: 6/16/2022    Discharge date and time:  6/17/2022  5:44 PM    Admission Diagnoses: Principal Problem:    Acute coronary syndrome [facility])  Active Problems:    CAD in native artery    Aneurysm of infrarenal abdominal aorta (HCC)    S/P coronary artery stent placement    Hypercholesterolemia with target low density lipoprotein (LDL) cholesterol less than 70 mg/dL    Bilateral carotid artery stenosis    History of tobacco use  Resolved Problems:    * No resolved hospital problems. *      Discharge Diagnoses: Principal Problem:    Acute coronary syndrome [facility])  Active Problems:    CAD in native artery    Aneurysm of infrarenal abdominal aorta (HCC)    S/P coronary artery stent placement    Hypercholesterolemia with target low density lipoprotein (LDL) cholesterol less than 70 mg/dL    Bilateral carotid artery stenosis    History of tobacco use  Resolved Problems:    * No resolved hospital problems. *      Consults:  IP CONSULT TO CARDIOLOGY  IP CONSULT TO CARDIAC REHAB  IP CONSULT TO CARDIAC REHAB    Procedures: Cardiac Cath, Stenting    Hospital Course:   Mr. Lita Mann is a 71years old male with PMH of carotid artery stenosis, CAD, HCC cancer, CVA, tobacco abuse, HLD, presented to ER with complaint of left-sided chest pain radiating towards left arm after movwng his lawn, aggravated by exertion along with some tingling sensation in his left arm and cold sweating but denies any diaphoresis, dizziness, nausea, vomiting. In ER, his vitals were stable. Labs were unremarkable. Patient was admitted for further cardiac work-up and cardiology evaluation.   Nuclear stress test showed left EF 68%, small to medium sized, medium intensity mid to distal apical wall scar with mild periscar ischemia\". Cardiology was consulted and patient underwent cardiac cath with stent placement on 6/16/2022. Cardiac rehab was consulted as well. Was recommended to discharge patient home to follow-up outpatient with cardiology. Patient is stable to be discharged. Discharge Exam:    General Appearance: alert and oriented to person, place and time and in no acute distress  Skin: warm and dry  Head: normocephalic and atraumatic  Eyes: pupils equal, round, and reactive to light, extraocular eye movements intact, conjunctivae normal  Neck: neck supple and non tender without mass   Pulmonary/Chest: clear to auscultation bilaterally- no wheezes, rales or rhonchi, normal air movement, no respiratory distress  Cardiovascular: normal rate, normal S1 and S2 and no carotid bruits  Abdomen: soft, non-tender, non-distended, normal bowel sounds, no masses or organomegaly  Extremities: no cyanosis, no clubbing and no edema  Neurologic: no cranial nerve deficit and speech normal    I/O last 3 completed shifts:  In: -   Out: 300 [Urine:300]  I/O this shift:  In: 600 [P.O.:600]  Out: -       LABS:  Recent Labs     06/14/22  2110 06/15/22  0541 06/17/22  0658    138 141   K 4.5 4.1 4.5  4.5    105 105   CO2 27 24 26   BUN 17 15 14   CREATININE 0.9 0.8 0.9   GLUCOSE 108* 98 102*   CALCIUM 9.1 8.7 9.1       Recent Labs     06/14/22  2110 06/15/22  0541 06/17/22  0658   WBC 7.6 6.0 7.5   RBC 4.55 4.39 4.69   HGB 13.9 13.5 15.2   HCT 42.4 41.9 44.3   MCV 93.2 95.4 94.5   MCH 30.5 30.8 32.4   MCHC 32.8 32.2 34.3   RDW 13.3 13.4 13.4    229 248   MPV 9.9 10.3 10.1       No results for input(s): POCGLU in the last 72 hours. Imaging:  No results found.     Patient Instructions:      Medication List      START taking these medications    prasugrel 10 MG Tabs  Commonly known as: EFFIENT  Take 1 tablet by mouth daily  Start taking on: June 18, 2022     rosuvastatin 20 MG tablet  Commonly known as: CRESTOR  Take 1 tablet by mouth daily  Start taking on: June 18, 2022        Jimmye Crigler taking these medications    aspirin 81 MG EC tablet     lisinopril 10 MG tablet  Commonly known as: PRINIVIL;ZESTRIL  Take 1 tablet by mouth daily     nitroGLYCERIN 0.4 MG SL tablet  Commonly known as: Nitrostat  Place 1 tablet under the tongue every 5 minutes as needed for Chest pain up to max of 3 total doses. If no relief after 1 dose, call 911.      pantoprazole 40 MG tablet  Commonly known as: PROTONIX  Take 1 tablet by mouth every morning (before breakfast)        STOP taking these medications    naproxen 500 MG tablet  Commonly known as: NAPROSYN           Where to Get Your Medications      These medications were sent to [facility] [address] [address]    Phone: [phone]   · prasugrel 10 MG Tabs  · rosuvastatin 20 MG tablet           Note that more than 30 minutes was spent in preparing discharge papers, discussing discharge with patient, medication review, etc.    Signed:  Electronically signed by Katherine Powers MD on 6/17/2022 at 5:44 PM

## 2022-06-17 NOTE — CONSULTS
Chest pain up to max of 3 total doses. If no relief after 1 dose, call 911. 6/15/22   Saleem Sample, DO   lisinopril (PRINIVIL;ZESTRIL) 10 MG tablet Take 1 tablet by mouth daily 6/15/22   Saleem Sample, DO   naproxen (NAPROSYN) 500 MG tablet Take 500 mg by mouth daily as needed for Pain     Historical Provider, MD   aspirin 81 MG EC tablet Take 81 mg by mouth daily    Historical Provider, MD       Allergies:  Patient has no known allergies. Review of Systems:   · Constitutional: there has been no unanticipated weight loss. There's been no change in energy level, sleep pattern or activity level. No fever chills or rigors. · Eyes: No visual changes or diplopia. No scleral icterus. · ENT: No Headaches, hearing loss or vertigo. No mouth sores or sore throat. No change in taste or smell. · Cardiovascular: Had chest discomfort radiating into his neck and jaw, dyspnea on exertion, palpitations, loss of consciousness or phlebitis. · Respiratory: No cough or wheezing, no sputum production. No hemoptysis, pleuritic pain. · Gastrointestinal: No abdominal pain, appetite loss, blood in stools. No change in bowel habits. No hematemesis  · Genitourinary: No dysuria, trouble voiding or hematuria. No nocturia or increased frequency. · Musculoskeletal:  No gait disturbance, weakness or joint complaints. · Integumentary: No rash or pruritis. · Neurological: No headache, diplopia, change in muscle strength, numbness or tingling. No change in gait, balance, coordination, mood, affect, memory, mentation, behavior. · Psychiatric: No anxiety or depression. · Endocrine: No temperature intolerance. No excessive thirst, fluid intake, or urination. No tremor. · Hematologic/Lymphatic: No abnormal bruising or bleeding, blood clots or swollen lymph nodes. · Allergic/Immunologic: No nasal congestion or hives.     Physical Examination:    Vital Signs: BP (!) 141/50   Pulse 72   Temp 97 °F (36.1 °C) (Temporal)   Resp 18 SpO2 97%   General appearance: Well preserved, mesomorphic body habitus, alert, no distress. Skin: Skin color, texture, turgor normal. No rashes or lesions. No induration or tightening palpated. Head: Normocephalic. No masses, lesions, tenderness or abnormalities  Eyes: Conjunctivae/corneas clear. PERRL, EOMs intact. Sclera non icteric. Ears: External ears normal. Canals clear. TM's clear bilaterally. Hearing normal to finger rub. Nose/Sinuses: Nares normal. Septum midline. Mucosa normal. No drainage or sinus tenderness. Oropharynx: Lips, mucosa, and tongue normal. Oropharynx clear with no exudate seen. Neck: Neck supple and symmetric. No adenopathy. Thyroid symmetric, normal size, without nodules. Trachea is midline. Carotids brisk in upstroke without bruits, no abnormal JVP noted at 45°. Chest: Even excursion  Lungs: Lungs clear to auscultation bilaterally. No retractions or use of accessory muscles. No tactile vocal fremitus. No rhonchi, crackles or rales. Heart:  S1 > S2. Regular rate and rhythm. No gallop or murmur. No rub, palpable thrill or heave noted. PMI 5th intercostal space midclavicular line. Abdomen: Abdomen soft, moderately protuberant, non-tender. BS normal. No masses, organomegaly. No hernia noted. Extremities: Extremities normal. No deformities, edema, or skin discoloration. No cyanosis or clubbing noted to the nails. Peripheral pulses present 2+ upper extremities andpresent 2+  lower exremities. Musculoskeletal: Spine ROM normal. Muscular strength intact. Neuro: Cranial nerves intact. Motor: Strength 5/5 in all extremities. Reflexes 2+ in all extremities. No focal weakness. Sensory: grossly normal to touch. Coordination intact.     Pertinent Labs:  CBC:   Recent Labs     06/14/22  2110 06/15/22  0541   WBC 7.6 6.0   HGB 13.9 13.5    229     BMP:  Recent Labs     06/14/22  2110 06/14/22  2110 06/15/22  0541     --  138   K 4.5  --  4.1     --  105   CO2 27  --  24 BUN 17  --  15   CREATININE 0.9  --  0.8   GLUCOSE 108*  --  98   LABGLOM >60   < > >60    < > = values in this interval not displayed. ABGs:   No results found for: PHART  INR:   No results for input(s): INR in the last 72 hours. BNP: No results for input(s): BNP in the last 72 hours. TSH:   Lab Results   Component Value Date    TSH 1.750 06/15/2022      Cardiac Injury Profile:   No results for input(s): CKTOTAL, CKMB, CKMBINDEX, TROPONINI in the last 72 hours. Lipid Profile:   Lab Results   Component Value Date    TRIG 153 06/15/2022    HDL 37 06/15/2022    LDLCALC 91 06/15/2022    CHOL 159 06/15/2022      Hemoglobin A1C:   No components found for: HGBA1C   ECG:  See report    Radiology:  XR CHEST PORTABLE    Result Date: 6/14/2022  No pneumonia or pleural effusion. NM Cardiac Stress Test Nuclear Imaging    Result Date: 6/15/2022  1: There is a small to medium size, medium intensity mid to distal and apical wall scar with mild periscar ischemia 2  The estimated left ventricular ejection fraction is 68%. 3: Please see separate report for EKG and hemodynamic aspect of the stress test. 4: RISK SCAN: Intermediate risk scan     CT Lung Screen (Initial/Annual)    Result Date: 6/13/2022  No pulmonary nodules LUNG RADS: Per ACR Lung-RADS Version 1.1 Lung rads 1. Continue annual low-dose CT screening of the chest. RECOMMENDATIONS: If you would like to register your patient with the [facility], please contact the Nurse Navigator at [phone]. Unavailable he did she can be her last meal also have chocolate    Assessment:    Patient Active Problem List   Diagnosis    Chest discomfort    Stenosis of both external carotid arteries    Bilateral carotid artery stenosis    History of tobacco use    Chest pain with high risk for cardiac etiology    Chest pain    CAD in native artery       Plan:  Following a complete review of Mr. Nuno's clinical history I have offered cardiac catheterization/PCI and have discussed the indications risks and benefits in detail with both he and his wife and he indicates he fully understands and wishes to proceed. Additionally, I explained the complications and consequences that might be encountered by smoking. These include but are not limited to lung disease, cancer, stroke, heart disease and ultimately death. Mr. Komal Carson expressed understanding. I have spent more than 55 minutes face to face with Monie Membreno greater than 50% of this time instructing and counseling the patient  regarding my findings and recommendations and I have answered all questions as posed to me by Mr. Nuno and family members present. Thank you for allowing me to consult in the care of this patient. Jayme Alonso DO DO, FACP, [facility], [facility]    NOTE:  This report was transcribed using voice recognition software. Every effort was made to ensure accuracy; however, inadvertent computerized transcription errors may be present.

## 2022-06-29 ENCOUNTER — TELEPHONE (OUTPATIENT)
Dept: CASE MANAGEMENT | Age: 72
End: 2022-06-29

## 2022-06-29 NOTE — TELEPHONE ENCOUNTER
No call, encounter opened to process CT Lung Screening. CT Lung Screen: 6/13/2022    Impression   No pulmonary nodules       LUNG RADS:   Per ACR Lung-RADS Version 1.1       Lung rads 1.  Continue annual low-dose CT screening of the chest.       RECOMMENDATIONS:   If you would like to register your patient with the [place] [id number], please contact the Nurse Navigator at   [phone]. Pack years: 27    Social History     Tobacco Use  Smoking Status: Former Smoker    Start Date:    Quit Date: 01/01/2017   Types: Cigarettes   Packs/Day: 1   Years: 30   Pack Years: 27   Smokeless Tobacco: Never Used         Results letter sent to patient via my chart or mailed.      [facility]

## 2022-07-27 ENCOUNTER — TELEPHONE (OUTPATIENT)
Dept: VASCULAR SURGERY | Age: 72
End: 2022-07-27

## 2022-07-28 ENCOUNTER — OFFICE VISIT (OUTPATIENT)
Dept: VASCULAR SURGERY | Age: 72
End: 2022-07-28
Payer: MEDICARE

## 2022-07-28 DIAGNOSIS — I65.23 BILATERAL CAROTID ARTERY STENOSIS: ICD-10-CM

## 2022-07-28 DIAGNOSIS — I71.43 ANEURYSM OF INFRARENAL ABDOMINAL AORTA: Primary | Chronic | ICD-10-CM

## 2022-07-28 PROBLEM — R07.9 CHEST PAIN WITH HIGH RISK FOR CARDIAC ETIOLOGY: Status: RESOLVED | Noted: 2022-06-14 | Resolved: 2022-07-28

## 2022-07-28 PROBLEM — R07.9 CHEST PAIN: Status: RESOLVED | Noted: 2022-06-14 | Resolved: 2022-07-28

## 2022-07-28 PROCEDURE — 1036F TOBACCO NON-USER: CPT | Performed by: SURGERY

## 2022-07-28 PROCEDURE — 1123F ACP DISCUSS/DSCN MKR DOCD: CPT | Performed by: SURGERY

## 2022-07-28 PROCEDURE — G8427 DOCREV CUR MEDS BY ELIG CLIN: HCPCS | Performed by: SURGERY

## 2022-07-28 PROCEDURE — G8417 CALC BMI ABV UP PARAM F/U: HCPCS | Performed by: SURGERY

## 2022-07-28 PROCEDURE — 99214 OFFICE O/P EST MOD 30 MIN: CPT | Performed by: SURGERY

## 2022-07-28 PROCEDURE — 3017F COLORECTAL CA SCREEN DOC REV: CPT | Performed by: SURGERY

## 2022-07-28 RX ORDER — METOPROLOL SUCCINATE 50 MG/1
50 TABLET, EXTENDED RELEASE ORAL DAILY
COMMUNITY
Start: 2022-06-27

## 2022-07-28 RX ORDER — AMLODIPINE BESYLATE 5 MG/1
5 TABLET ORAL DAILY
COMMUNITY
Start: 2022-07-19

## 2022-07-28 NOTE — PROGRESS NOTES
Chief Complaint:   Chief Complaint   Patient presents with    Circulatory Problem     AAA         HPI: Patient came to the office, for the evaluation, of the ultrasound abdominal aorta that revealed the presence of a 4 cm aneurysm, tells me that was discovered, at the time of cardiac catheterization when he developed chest pain underwent angioplasty and stenting by Dr. Andrea Jackson and was referred by his PCP for further evaluation    Patient denies any abdominal pain or back pain    Patient has noted a mild carotid stenosis of 30%, based on the ultrasound done last year    Patient denies any chest pain or shortness of breath at the present time      Patient denies any focal lateralizing neurological symptoms like loss of speech, vision or loss of function of extremity    Patient can walk a few blocks , and denies any symptoms of rest pain    No Known Allergies    Current Outpatient Medications   Medication Sig Dispense Refill    metoprolol succinate (TOPROL XL) 50 MG extended release tablet Take 50 mg by mouth in the morning. amLODIPine (NORVASC) 5 MG tablet Take 5 mg by mouth in the morning. rosuvastatin (CRESTOR) 20 MG tablet Take 1 tablet by mouth daily 30 tablet 3    prasugrel (EFFIENT) 10 MG TABS Take 1 tablet by mouth daily 90 tablet 3    aspirin 81 MG EC tablet Take 81 mg by mouth daily       No current facility-administered medications for this visit.        Past Medical History:   Diagnosis Date    Arthritis     Bilateral carotid artery stenosis 06/03/2021    CAD in native artery 06/16/2022    Cancer [facility])     prostate 2009    DILLAN (cerebral atherosclerosis)     Gout     History of tobacco use 06/03/2021    Hypercholesterolemia     Stenosis of both external carotid arteries 06/03/2021       Past Surgical History:   Procedure Laterality Date    CARDIAC CATHETERIZATION      CARDIOVASCULAR STRESS TEST N/A 06/15/2022    lexiscan stress test    JOINT REPLACEMENT      knee    PROSTATECTOMY  01/01/2009 Family History   Problem Relation Age of Onset    Cancer Mother         bladder    Cancer Father         nose melanoma    Mult Sclerosis Sister        Social History     Socioeconomic History    Marital status:      Spouse name: Not on file    Number of children: Not on file    Years of education: Not on file    Highest education level: Not on file   Occupational History    Not on file   Tobacco Use    Smoking status: Former     Packs/day: 1.00     Years: 30.00     Pack years: 30.00     Types: Cigarettes     Quit date: 2017     Years since quittin.5    Smokeless tobacco: Never   Vaping Use    Vaping Use: Never used   Substance and Sexual Activity    Alcohol use: Yes     Alcohol/week: 1.0 standard drink     Types: 1 Cans of beer per week     Comment: rare    Drug use: No    Sexual activity: Not on file     Comment: N/A   Other Topics Concern    Not on file   Social History Narrative    Not on file     Social Determinants of Health     Financial Resource Strain: Not on file   Food Insecurity: Not on file   Transportation Needs: Not on file   Physical Activity: Not on file   Stress: Not on file   Social Connections: Not on file   Intimate Partner Violence: Not on file   Housing Stability: Not on file       Review of Systems:    Eyes:  No blurring, diplopia or vision loss. Respiratory:  No cough, pleuritic chest pain, dyspnea, or wheezing. Cardiovascular: No angina, palpitations . Coronary artery disease with history of coronary artery angioplasty and stent placement, hypertension, hyperlipidemia  Musculoskeletal:  No arthritis or weakness. Neurologic:  No paralysis, paresis, paresthesia, seizures or headache. Endocrinology:           Physical Exam:  General appearance:  Alert, awake, oriented x 3. No distress. Eyes:  Conjunctivae appear normal; PERRL  Neck:  No jugular venous distention, lymphadenopathy or thyromegaly. No evidence of carotid bruit  Lungs:  Clear to ausculation bilaterally. No rhonchi, crackles, wheezes  Heart:  Regular rate and rhythm. No rub or murmur  Abdomen:  Soft, non-tender. No masses, organomegaly. Slightly prominent pulse noted on deep palpation  Musculoskeletal : No joint effusions, tenderness swelling    Neuro: Speech is intact. Moving all extremities. No focal motor or sensory deficits      Extremities:  Both feet are warm to touch. The color of both feet is normal.        Pulses Right  Left    Brachial 3 3    Radial    3=normal   Femoral 2 2  2=diminished   Popliteal    1=barely palpable   Dorsalis pedis    0=absent   Posterior tibial 2 2  4=aneurysmal             Other pertinent information:1. The past medical records were reviewed. 2.  Ultrasound of the abdomen done at the [facility] clinic was reviewed, maximum diameter 4.1 cm    Assessment:    1. Aneurysm of infrarenal abdominal aorta (HCC)    2. Bilateral carotid artery stenosis              Plan:       Discussed the patient regarding the ultrasound of the abdominal aorta, 4.1 cm abdominal aortic enzyme, natural history, slow gradual progression over many years was explained, reassured, was instructed call and come to the hospital for any abdominal pain or back pain, as the patient is anxious, recommend follow-up to see me back in 6 months    Patient was instructed to continue walking program and to call if any worsening of symptoms and to call if any focal lateralizing neurological symptoms like loss of speech, vision or loss of function of extremity. All the questions were answered. Indicated follow-up: Return in about 6 months (around 1/28/2023), or if symptoms worsen or fail to improve.

## 2022-09-26 NOTE — PLAN OF CARE
Pharmacokinetic Assessment Follow Up: IV Vancomycin    Vancomycin serum concentration assessment & plan:  Vancomycin random level resulted at 23.8 mcg/ml which is above target trough of 15 - 20 mcg/ml  Scr increased to 1.8 mg/dl today so regimen of 1250mg IV every 24 hours was discontinued in anticipation of high level  UOP 300ml so far today  Based on population kinetics and patients current kidney function, estimating half life to be ~18 hours [ke = 0.025843]  Keep pulse dosing, will obtain a random level tomorrow with AM labs  Pharmacy to redose based on kidney function and level       Drug levels (last 3 results):  Recent Labs   Lab Result Units 09/23/22  1535 09/25/22  1509   Vancomycin, Random ug/mL  --  23.8   Vancomycin-Trough ug/mL 18.5  --        Pharmacy will continue to follow and monitor vancomycin.    Please contact pharmacy at extension [phone] for questions regarding this assessment.    Thank you for the consult,   Riddhi Velasquez       Patient brief summary:  Audrey Oneil Jr. is a 71 y.o. male initiated on antimicrobial therapy with IV Vancomycin for treatment of MRSA bacteremia    The patient's current regimen is pulse dosing    Drug Allergies:   Review of patient's allergies indicates:   Allergen Reactions    Iodine containing multivitamin Swelling     itching    Keflex [cephalexin] Swelling     Eyes.  Tolerated multiple doses of zosyn and 1 dose of augmentin in 2015 and 2016, respectively    Peaches [peach (prunus persica)] Swelling     eyes    Shellfish containing products Swelling    Fig tree Swelling     itching    Tuberculin spenser test ppd Rash       Actual Body Weight:   94.4 kg    Renal Function:   Estimated Creatinine Clearance: 41.9 mL/min (A) (based on SCr of 1.8 mg/dL (H)).,     Dialysis Method (if applicable):  N/A    CBC (last 72 hours):  Recent Labs   Lab Result Units 09/23/22  0643 09/24/22  0521 09/25/22  0427   WBC K/uL 6.07 5.18 5.25   Hemoglobin g/dL 10.8* 10.6* 10.2*   Hematocrit  Problem: Pain  Goal: Verbalizes/displays adequate comfort level or baseline comfort level  Outcome: Progressing % 33.1* 32.9* 32.4*   Platelets K/uL 257 263 288   Gran % % 59.7 63.3 62.5   Lymph % % 29.3 26.3 26.9   Mono % % 7.6 7.3 7.2   Eosinophil % % 1.8 1.7 1.7   Basophil % % 0.3 0.2 0.4   Differential Method  Automated Automated Automated       Metabolic Panel (last 72 hours):  Recent Labs   Lab Result Units 09/23/22  0643 09/24/22  0521 09/25/22  0427   Sodium mmol/L 134* 134* 134*   Potassium mmol/L 3.7 3.7 4.1   Chloride mmol/L 101 101 100   CO2 mmol/L 24 24 27   Glucose mg/dL 108 115* 89   BUN mg/dL 21 22 24*   Creatinine mg/dL 1.5* 1.5* 1.8*   Albumin g/dL 2.9* 2.9* 3.0*   Total Bilirubin mg/dL 0.5 0.5 0.3   Alkaline Phosphatase U/L 80 78 84   AST U/L 18 17 20   ALT U/L 16 19 19   Magnesium mg/dL 1.8 1.8 1.9       Vancomycin Administrations:  vancomycin given in the last 96 hours                     vancomycin 1.25 g in dextrose 5% 250 mL IVPB (ready to mix) (mg) 1,250 mg New Bag 09/24/22 1618     1,250 mg New Bag 09/23/22 1719     1,250 mg New Bag 09/22/22 1736                    Microbiologic Results:  Microbiology Results (last 7 days)       Procedure Component Value Units Date/Time    Blood culture [[id number]] Collected: 09/18/22 1117    Order Status: Completed Specimen: Blood from Peripheral, Right Hand Updated: 09/23/22 1412     Blood Culture, Routine No growth after 5 days.    Blood culture [[id number]] Collected: 09/18/22 1105    Order Status: Completed Specimen: Blood from Antecubital, Right Arm Updated: 09/23/22 1212     Blood Culture, Routine No growth after 5 days.    Blood culture [[id number]]  (Abnormal) Collected: 09/16/22 0443    Order Status: Completed Specimen: Blood Updated: 09/19/22 0951     Blood Culture, Routine Gram stain aer bottle: Gram positive cocci in clusters resembling Staph      Positive results previously called 09/17/2022  05:34      METHICILLIN RESISTANT STAPHYLOCOCCUS AUREUS  ID consult required at [facility].Daniel,Leigh Ann and Nancy orellana.  For susceptibility see order #[id number]       Narrative:      Lft wrist    Blood culture [[id number]]  (Abnormal)  (Susceptibility) Collected: 09/14/22 1302    Order Status: Completed Specimen: Blood from Wrist, Left Updated: 09/19/22 0734     Blood Culture, Routine Gram stain robert bottle: Gram positive cocci      Positive results previously called 09/15/2022  02:59      METHICILLIN RESISTANT STAPHYLOCOCCUS AUREUS  [facility] at [facility].       Comment: Previous comment was modified by KRISTINA at 09:12 on 09/17/2022 ID   consult required at [facility].

## 2023-01-11 ENCOUNTER — HOSPITAL ENCOUNTER (OUTPATIENT)
Age: 73
Discharge: HOME | End: 2023-01-11
Payer: MEDICARE

## 2023-01-11 ENCOUNTER — HOSPITAL ENCOUNTER (OUTPATIENT)
Dept: GENERAL RADIOLOGY | Age: 73
Discharge: HOME | End: 2023-01-13
Payer: MEDICARE

## 2023-01-11 ENCOUNTER — HOSPITAL ENCOUNTER (OUTPATIENT)
Age: 73
Discharge: HOME | End: 2023-01-13
Payer: MEDICARE

## 2023-01-11 DIAGNOSIS — R07.89 OTHER CHEST PAIN: ICD-10-CM

## 2023-01-11 PROCEDURE — 71046 X-RAY EXAM CHEST 2 VIEWS: CPT

## 2023-01-11 PROCEDURE — 93005 ELECTROCARDIOGRAM TRACING: CPT | Performed by: FAMILY MEDICINE

## 2023-01-12 LAB
EKG ATRIAL RATE: 67 BPM
EKG P AXIS: 63 DEGREES
EKG P-R INTERVAL: 166 MS
EKG Q-T INTERVAL: 434 MS
EKG QRS DURATION: 148 MS
EKG QTC CALCULATION (BAZETT): 458 MS
EKG R AXIS: 77 DEGREES
EKG T AXIS: 50 DEGREES
EKG VENTRICULAR RATE: 67 BPM

## 2023-01-12 PROCEDURE — 93010 ELECTROCARDIOGRAM REPORT: CPT | Performed by: INTERNAL MEDICINE

## 2023-01-13 DIAGNOSIS — I71.43 INFRARENAL ABDOMINAL AORTIC ANEURYSM (AAA) WITHOUT RUPTURE: Primary | ICD-10-CM

## 2023-01-25 ENCOUNTER — TELEPHONE (OUTPATIENT)
Dept: VASCULAR SURGERY | Age: 73
End: 2023-01-25

## 2023-01-26 ENCOUNTER — HOSPITAL ENCOUNTER (OUTPATIENT)
Dept: CARDIOLOGY | Age: 73
Discharge: HOME | End: 2023-01-26
Payer: MEDICARE

## 2023-01-26 ENCOUNTER — OFFICE VISIT (OUTPATIENT)
Dept: VASCULAR SURGERY | Age: 73
End: 2023-01-26
Payer: MEDICARE

## 2023-01-26 VITALS — BODY MASS INDEX: 28 KG/M2 | HEIGHT: 71 IN | WEIGHT: 200 LBS

## 2023-01-26 DIAGNOSIS — I71.43 INFRARENAL ABDOMINAL AORTIC ANEURYSM (AAA) WITHOUT RUPTURE: ICD-10-CM

## 2023-01-26 DIAGNOSIS — I65.23 STENOSIS OF BOTH EXTERNAL CAROTID ARTERIES: Primary | ICD-10-CM

## 2023-01-26 DIAGNOSIS — I65.23 BILATERAL CAROTID ARTERY STENOSIS: ICD-10-CM

## 2023-01-26 PROCEDURE — 3017F COLORECTAL CA SCREEN DOC REV: CPT | Performed by: SURGERY

## 2023-01-26 PROCEDURE — 1036F TOBACCO NON-USER: CPT | Performed by: SURGERY

## 2023-01-26 PROCEDURE — G8417 CALC BMI ABV UP PARAM F/U: HCPCS | Performed by: SURGERY

## 2023-01-26 PROCEDURE — 1123F ACP DISCUSS/DSCN MKR DOCD: CPT | Performed by: SURGERY

## 2023-01-26 PROCEDURE — G8484 FLU IMMUNIZE NO ADMIN: HCPCS | Performed by: SURGERY

## 2023-01-26 PROCEDURE — G8427 DOCREV CUR MEDS BY ELIG CLIN: HCPCS | Performed by: SURGERY

## 2023-01-26 PROCEDURE — 99214 OFFICE O/P EST MOD 30 MIN: CPT | Performed by: SURGERY

## 2023-01-26 PROCEDURE — 93978 VASCULAR STUDY: CPT

## 2023-01-26 RX ORDER — CLOPIDOGREL BISULFATE 75 MG/1
75 TABLET ORAL DAILY
COMMUNITY

## 2023-01-26 RX ORDER — FLUTICASONE PROPIONATE 50 MCG
1 SPRAY, SUSPENSION (ML) NASAL DAILY
COMMUNITY

## 2023-01-26 NOTE — PROGRESS NOTES
[facility] - [facility]    This is a pre read worksheet - prior to official physician interpretation    Ashtyn De La Fuente  1950  Date of study: 1/26/23    Indication for study:  AAA  Study :  Aortic Ultrasound    Diameter Aorta:     Proximal:   cm     Mid:   cm     Distal:  4.1 x 4.0 cm     Right iliac: 1.1 x 1.1 cm     Left iliac: 1.1 x 1.0 cm    Additional comments: slightly limited d/t bowel gas        LAST STUDY   6/29/2022  4.1 cm  Rt 0.8  Lt 0.9

## 2023-01-26 NOTE — PROGRESS NOTES
Chief Complaint:   Chief Complaint   Patient presents with    Check-Up     AAA         HPI: Patient came to the office, for the evaluation of multiple vascular issues including mild carotid artery disease, also history of abdominal aortic aneurysm, patent 4 cm, doing well, denies any abdominal pain or back pain    Patient denies any focal lateralizing neurological symptoms like loss of speech, vision or loss of function of extremity    Patient can walk a few blocks , and denies any symptoms of rest pain    Allergies   Allergen Reactions    Lisinopril        Current Outpatient Medications   Medication Sig Dispense Refill    clopidogrel (PLAVIX) 75 MG tablet Take 75 mg by mouth daily      fluticasone (FLONASE) 50 MCG/ACT nasal spray 1 spray by Each Nostril route daily      metoprolol succinate (TOPROL XL) 50 MG extended release tablet Take 50 mg by mouth in the morning. amLODIPine (NORVASC) 5 MG tablet Take 10 mg by mouth daily      rosuvastatin (CRESTOR) 20 MG tablet Take 1 tablet by mouth daily 30 tablet 3    prasugrel (EFFIENT) 10 MG TABS Take 1 tablet by mouth daily 90 tablet 3    aspirin 81 MG EC tablet Take 81 mg by mouth daily       No current facility-administered medications for this visit.        Past Medical History:   Diagnosis Date    Arthritis     Bilateral carotid artery stenosis 06/03/2021    CAD in native artery 06/16/2022    Cancer [facility])     prostate 2009    DILLAN (cerebral atherosclerosis)     Gout     History of tobacco use 06/03/2021    Hypercholesterolemia     Stenosis of both external carotid arteries 06/03/2021       Past Surgical History:   Procedure Laterality Date    CARDIAC CATHETERIZATION      CARDIOVASCULAR STRESS TEST N/A 06/15/2022    lexiscan stress test    JOINT REPLACEMENT      knee    PROSTATECTOMY  01/01/2009       Family History   Problem Relation Age of Onset    Cancer Mother         bladder    Cancer Father         nose melanoma    Mult Sclerosis Sister        Social History Socioeconomic History    Marital status:      Spouse name: Not on file    Number of children: Not on file    Years of education: Not on file    Highest education level: Not on file   Occupational History    Not on file   Tobacco Use    Smoking status: Former     Packs/day: 1.00     Years: 30.00     Pack years: 30.00     Types: Cigarettes     Quit date: 2017     Years since quittin.0    Smokeless tobacco: Never   Vaping Use    Vaping Use: Never used   Substance and Sexual Activity    Alcohol use: Yes     Alcohol/week: 1.0 standard drink     Types: 1 Cans of beer per week     Comment: rare    Drug use: No    Sexual activity: Not on file     Comment: N/A   Other Topics Concern    Not on file   Social History Narrative    Not on file     Social Determinants of Health     Financial Resource Strain: Not on file   Food Insecurity: Not on file   Transportation Needs: Not on file   Physical Activity: Not on file   Stress: Not on file   Social Connections: Not on file   Intimate Partner Violence: Not on file   Housing Stability: Not on file       Review of Systems:    Eyes:  No blurring, diplopia or vision loss. Respiratory:  No cough, pleuritic chest pain, dyspnea, or wheezing. Cardiovascular: No angina, palpitations . Coronary artery disease, postcoronary artery angioplasty stent placement, hypertension, hyperlipidemia  Musculoskeletal:  No arthritis or weakness. Neurologic:  No paralysis, paresis, paresthesia, seizures or headache. Endocrinology:           Physical Exam:  General appearance:  Alert, awake, oriented x 3. No distress. Eyes:  Conjunctivae appear normal; PERRL  Neck:  No jugular venous distention, lymphadenopathy or thyromegaly. Carotid bruit noted  Lungs:  Clear to ausculation bilaterally. No rhonchi, crackles, wheezes  Heart:  Regular rate and rhythm. No rub or murmur  Abdomen:  Soft, non-tender. No masses, organomegaly.   Prominent pulse noted on deep palpation, nontender  Musculoskeletal : No joint effusions, tenderness swelling    Neuro: Speech is intact. Moving all extremities. No focal motor or sensory deficits      Extremities:  Both feet are warm to touch. The color of both feet is normal.        Pulses Right  Left    Brachial 3 3    Radial    3=normal   Femoral 2 2  2=diminished   Popliteal    1=barely palpable   Dorsalis pedis    0=absent   Posterior tibial 2 2  4=aneurysmal             Other pertinent information:1. The past medical records were reviewed. Assessment:    1. Stenosis of both external carotid arteries    2. Infrarenal abdominal aortic aneurysm (AAA) without rupture    3. Bilateral carotid artery stenosis              Plan:       Discussed the patient and the family regarding the ultrasound, stable aneurysm 4.1 cm asymptomatic, natural history, slow gradual progression in size, many months and years was explained, follow-up in 1 year but call and come to the hospital if any strokelike symptoms, explained              Patient was instructed to continue walking program and to call if any worsening of symptoms and to call if any focal lateralizing neurological symptoms like loss of speech, vision or loss of function of extremity. All the questions were answered. Indicated follow-up: Return in about 1 year (around 1/26/2024), or if symptoms worsen or fail to improve.

## 2023-01-29 NOTE — PROCEDURES
[address]                  [address] [place] [id number] [facility]                                VASCULAR REPORT    PATIENT NAME: Priscilla Butler                     :        1950  MED REC NO:   [id number]                            ROOM:  ACCOUNT NO:   [de-identified]                           ADMIT DATE: 2023  PROVIDER:     Patrick Kirby MD    DATE OF PROCEDURE:  2023    ULTRASOUND OF ABDOMINAL AORTA    INDICATION:  History of abdominal aortic aneurysm. FINDINGS:  Duplex scanning of the abdominal aorta revealed dilatation of  the aorta to 4 cm x 4.1 cm without involvement of iliac artery. IMPRESSION:  Abdominal aortic aneurysm, maximum diameter of 4.1 cm,  without any change from last study.         Luis Lux MD    D: 2023 18:52:17       T: 2023 18:54:44     [id number]  Job#: [id number]     Doc#: [id number]    CC:

## 2023-06-05 ENCOUNTER — TRANSCRIBE ORDERS (OUTPATIENT)
Dept: ADMINISTRATIVE | Age: 73
End: 2023-06-05

## 2023-06-05 DIAGNOSIS — Z87.891 PERSONAL HISTORY OF TOBACCO USE, PRESENTING HAZARDS TO HEALTH: ICD-10-CM

## 2023-06-05 DIAGNOSIS — F17.211 CIGARETTE NICOTINE DEPENDENCE IN REMISSION: Primary | ICD-10-CM

## 2023-06-26 ENCOUNTER — HOSPITAL ENCOUNTER (OUTPATIENT)
Dept: CT IMAGING | Age: 73
Discharge: HOME | End: 2023-06-26
Payer: MEDICARE

## 2023-06-26 DIAGNOSIS — Z87.891 PERSONAL HISTORY OF TOBACCO USE, PRESENTING HAZARDS TO HEALTH: ICD-10-CM

## 2023-06-26 DIAGNOSIS — F17.211 CIGARETTE NICOTINE DEPENDENCE IN REMISSION: ICD-10-CM

## 2023-06-26 PROCEDURE — 71271 CT THORAX LUNG CANCER SCR C-: CPT

## 2023-06-27 ENCOUNTER — TELEPHONE (OUTPATIENT)
Dept: CASE MANAGEMENT | Age: 73
End: 2023-06-27

## 2024-01-10 DIAGNOSIS — I71.43 INFRARENAL ABDOMINAL AORTIC ANEURYSM (AAA) WITHOUT RUPTURE (HCC): Primary | ICD-10-CM

## 2024-01-10 DIAGNOSIS — I65.23 BILATERAL CAROTID ARTERY STENOSIS: ICD-10-CM

## 2024-02-01 ENCOUNTER — OFFICE VISIT (OUTPATIENT)
Dept: VASCULAR SURGERY | Age: 74
End: 2024-02-01
Payer: MEDICARE

## 2024-02-01 ENCOUNTER — HOSPITAL ENCOUNTER (OUTPATIENT)
Dept: CARDIOLOGY | Age: 74
Discharge: HOME | End: 2024-02-03
Payer: MEDICARE

## 2024-02-01 VITALS — WEIGHT: 200 LBS | BODY MASS INDEX: 27.89 KG/M2

## 2024-02-01 DIAGNOSIS — I71.43 INFRARENAL ABDOMINAL AORTIC ANEURYSM (AAA) WITHOUT RUPTURE (HCC): ICD-10-CM

## 2024-02-01 DIAGNOSIS — I71.43 INFRARENAL ABDOMINAL AORTIC ANEURYSM (AAA) WITHOUT RUPTURE (HCC): Primary | ICD-10-CM

## 2024-02-01 DIAGNOSIS — Z87.891 HISTORY OF TOBACCO USE: ICD-10-CM

## 2024-02-01 DIAGNOSIS — I65.23 BILATERAL CAROTID ARTERY STENOSIS: ICD-10-CM

## 2024-02-01 DIAGNOSIS — I65.23 STENOSIS OF BOTH EXTERNAL CAROTID ARTERIES: ICD-10-CM

## 2024-02-01 PROBLEM — I24.9 ACUTE CORONARY SYNDROME (HCC): Status: RESOLVED | Noted: 2022-06-17 | Resolved: 2024-02-01

## 2024-02-01 LAB
VAS AORTA DIST AP: 4.06 CM
VAS AORTA DIST TR: 4.01 CM
VAS LEFT CCA DIST EDV: 28.4 CM/S
VAS LEFT CCA DIST PSV: 123.3 CM/S
VAS LEFT CCA PROX EDV: 28.7 CM/S
VAS LEFT CCA PROX PSV: 127.9 CM/S
VAS LEFT ECA EDV: 28.1 CM/S
VAS LEFT ECA PSV: 236.1 CM/S
VAS LEFT ICA DIST EDV: 39.7 CM/S
VAS LEFT ICA DIST PSV: 126.8 CM/S
VAS LEFT ICA MID EDV: 28.8 CM/S
VAS LEFT ICA MID PSV: 108.7 CM/S
VAS LEFT ICA PROX EDV: 47.3 CM/S
VAS LEFT ICA PROX PSV: 164 CM/S
VAS LEFT ICA/CCA PSV: 1.3 NO UNITS
VAS LEFT VERTEBRAL EDV: 13.3 CM/S
VAS LEFT VERTEBRAL PSV: 69.7 CM/S
VAS RIGHT CCA DIST EDV: 25.4 CM/S
VAS RIGHT CCA DIST PSV: 116.6 CM/S
VAS RIGHT CCA PROX EDV: 23.5 CM/S
VAS RIGHT CCA PROX PSV: 131.2 CM/S
VAS RIGHT ECA EDV: 21.1 CM/S
VAS RIGHT ECA PSV: 260.7 CM/S
VAS RIGHT ICA DIST EDV: 34.3 CM/S
VAS RIGHT ICA DIST PSV: 114.1 CM/S
VAS RIGHT ICA MID EDV: 36.3 CM/S
VAS RIGHT ICA MID PSV: 116.6 CM/S
VAS RIGHT ICA PROX EDV: 18.1 CM/S
VAS RIGHT ICA PROX PSV: 83.8 CM/S
VAS RIGHT ICA/CCA PSV: 0.9 NO UNITS
VAS RIGHT VERTEBRAL EDV: 19.8 CM/S
VAS RIGHT VERTEBRAL PSV: 74.2 CM/S

## 2024-02-01 PROCEDURE — 93976 VASCULAR STUDY: CPT

## 2024-02-01 PROCEDURE — G8427 DOCREV CUR MEDS BY ELIG CLIN: HCPCS | Performed by: SURGERY

## 2024-02-01 PROCEDURE — G8419 CALC BMI OUT NRM PARAM NOF/U: HCPCS | Performed by: SURGERY

## 2024-02-01 PROCEDURE — 93880 EXTRACRANIAL BILAT STUDY: CPT

## 2024-02-01 PROCEDURE — G8484 FLU IMMUNIZE NO ADMIN: HCPCS | Performed by: SURGERY

## 2024-02-01 PROCEDURE — 1123F ACP DISCUSS/DSCN MKR DOCD: CPT | Performed by: SURGERY

## 2024-02-01 PROCEDURE — 3017F COLORECTAL CA SCREEN DOC REV: CPT | Performed by: SURGERY

## 2024-02-01 PROCEDURE — 99214 OFFICE O/P EST MOD 30 MIN: CPT | Performed by: SURGERY

## 2024-02-01 PROCEDURE — 1036F TOBACCO NON-USER: CPT | Performed by: SURGERY

## 2024-02-01 RX ORDER — NEBIVOLOL 10 MG/1
TABLET ORAL DAILY
COMMUNITY

## 2024-02-01 RX ORDER — OLMESARTAN MEDOXOMIL AND HYDROCHLOROTHIAZIDE 40/25 40; 25 MG/1; MG/1
1 TABLET ORAL DAILY
COMMUNITY

## 2024-02-01 RX ORDER — OMEPRAZOLE 20 MG/1
20 CAPSULE, DELAYED RELEASE ORAL DAILY
COMMUNITY

## 2024-02-01 NOTE — PROGRESS NOTES
Chief Complaint:   Chief Complaint   Patient presents with    Circulatory Problem     Aaa stenosis of bilateral external carotid stenosis         HPI: Patient came to the office with his wife, for multiple vascular issues including bilateral carotid artery disease, also history of abdominal aortic aneurysm    Patient denies any abdominal pain or back pain    Patient quit smoking many years ago    His medical issues are stable including coronary artery disease with history of coronary artery angioplasty and stent placement hypertension, hyperlipidemia, GERD etc.      Patient denies any focal lateralizing neurological symptoms like loss of speech, vision or loss of function of extremity    Patient can walk a few blocks with no difficulty, and denies any symptoms of rest pain    Allergies   Allergen Reactions    Lisinopril        Current Outpatient Medications   Medication Sig Dispense Refill    nebivolol (BYSTOLIC) 10 MG tablet Take by mouth daily      omeprazole (PRILOSEC) 20 MG delayed release capsule Take 1 capsule by mouth daily      olmesartan-hydroCHLOROthiazide (BENICAR HCT) 40-25 MG per tablet Take 1 tablet by mouth daily      clopidogrel (PLAVIX) 75 MG tablet Take 1 tablet by mouth daily      amLODIPine (NORVASC) 5 MG tablet Take 2 tablets by mouth daily      rosuvastatin (CRESTOR) 20 MG tablet Take 1 tablet by mouth daily 30 tablet 3    fluticasone (FLONASE) 50 MCG/ACT nasal spray 1 spray by Each Nostril route daily      metoprolol succinate (TOPROL XL) 50 MG extended release tablet Take 50 mg by mouth in the morning.      prasugrel (EFFIENT) 10 MG TABS Take 1 tablet by mouth daily 90 tablet 3    aspirin 81 MG EC tablet Take 81 mg by mouth daily       No current facility-administered medications for this visit.       Past Medical History:   Diagnosis Date    Arthritis     Bilateral carotid artery stenosis 06/03/2021    CAD in native artery 06/16/2022    Cancer (HCC)     prostate 2009    DILLAN (cerebral

## 2024-07-16 ENCOUNTER — HOSPITAL ENCOUNTER (OUTPATIENT)
Dept: CT IMAGING | Age: 74
Discharge: HOME | End: 2024-07-16
Payer: MEDICARE

## 2024-07-16 DIAGNOSIS — Z87.891 FORMER SMOKER: ICD-10-CM

## 2024-07-16 DIAGNOSIS — F17.211 CIGARETTE NICOTINE DEPENDENCE IN REMISSION: ICD-10-CM

## 2024-07-16 DIAGNOSIS — Z87.891 PERSONAL HISTORY OF TOBACCO USE: ICD-10-CM

## 2024-07-16 PROCEDURE — 71271 CT THORAX LUNG CANCER SCR C-: CPT

## 2024-08-14 ENCOUNTER — HOSPITAL ENCOUNTER (OUTPATIENT)
Age: 74
Discharge: HOME | End: 2024-08-16

## 2024-08-14 LAB
ABO + RH BLD: NORMAL
ARM BAND NUMBER: NORMAL
BLOOD BANK SAMPLE EXPIRATION: NORMAL
BLOOD GROUP ANTIBODIES SERPL: NEGATIVE

## 2024-08-14 PROCEDURE — 86900 BLOOD TYPING SEROLOGIC ABO: CPT

## 2024-08-14 PROCEDURE — 86901 BLOOD TYPING SEROLOGIC RH(D): CPT

## 2024-08-14 PROCEDURE — 86850 RBC ANTIBODY SCREEN: CPT

## 2024-08-14 PROCEDURE — 87081 CULTURE SCREEN ONLY: CPT

## 2024-08-16 LAB
MICROORGANISM SPEC CULT: NORMAL
SPECIMEN DESCRIPTION: NORMAL

## 2024-08-27 ENCOUNTER — HOSPITAL ENCOUNTER (OUTPATIENT)
Age: 74
Discharge: HOME | End: 2024-08-29

## 2024-08-27 LAB
ANION GAP SERPL CALCULATED.3IONS-SCNC: 10 MMOL/L (ref 7–16)
BUN SERPL-MCNC: 25 MG/DL (ref 6–23)
CALCIUM SERPL-MCNC: 8.5 MG/DL (ref 8.6–10.2)
CHLORIDE SERPL-SCNC: 104 MMOL/L (ref 98–107)
CO2 SERPL-SCNC: 24 MMOL/L (ref 22–29)
CREAT SERPL-MCNC: 1.2 MG/DL (ref 0.7–1.2)
ERYTHROCYTE [DISTWIDTH] IN BLOOD BY AUTOMATED COUNT: 13.1 % (ref 11.5–15)
GFR, ESTIMATED: 63 ML/MIN/1.73M2
GLUCOSE SERPL-MCNC: 109 MG/DL (ref 74–99)
HCT VFR BLD AUTO: 34.1 % (ref 37–54)
HGB BLD-MCNC: 10.9 G/DL (ref 12.5–16.5)
MCH RBC QN AUTO: 30.3 PG (ref 26–35)
MCHC RBC AUTO-ENTMCNC: 32 G/DL (ref 32–34.5)
MCV RBC AUTO: 94.7 FL (ref 80–99.9)
PLATELET # BLD AUTO: 205 K/UL (ref 130–450)
PMV BLD AUTO: 10.8 FL (ref 7–12)
POTASSIUM SERPL-SCNC: 4.4 MMOL/L (ref 3.5–5)
RBC # BLD AUTO: 3.6 M/UL (ref 3.8–5.8)
SODIUM SERPL-SCNC: 138 MMOL/L (ref 132–146)
WBC OTHER # BLD: 8 K/UL (ref 4.5–11.5)

## 2024-08-27 PROCEDURE — 85027 COMPLETE CBC AUTOMATED: CPT

## 2024-08-27 PROCEDURE — 80048 BASIC METABOLIC PNL TOTAL CA: CPT

## 2025-01-30 DIAGNOSIS — I71.43 INFRARENAL ABDOMINAL AORTIC ANEURYSM (AAA) WITHOUT RUPTURE (HCC): Primary | ICD-10-CM

## 2025-02-06 ENCOUNTER — OFFICE VISIT (OUTPATIENT)
Dept: VASCULAR SURGERY | Age: 75
End: 2025-02-06
Payer: MEDICARE

## 2025-02-06 ENCOUNTER — HOSPITAL ENCOUNTER (OUTPATIENT)
Dept: CARDIOLOGY | Age: 75
Discharge: HOME | End: 2025-02-08
Payer: MEDICARE

## 2025-02-06 VITALS — BODY MASS INDEX: 27.89 KG/M2 | WEIGHT: 200 LBS

## 2025-02-06 DIAGNOSIS — I65.23 STENOSIS OF BOTH EXTERNAL CAROTID ARTERIES: ICD-10-CM

## 2025-02-06 DIAGNOSIS — I71.43 INFRARENAL ABDOMINAL AORTIC ANEURYSM (AAA) WITHOUT RUPTURE (HCC): Primary | ICD-10-CM

## 2025-02-06 DIAGNOSIS — I71.43 INFRARENAL ABDOMINAL AORTIC ANEURYSM (AAA) WITHOUT RUPTURE (HCC): ICD-10-CM

## 2025-02-06 DIAGNOSIS — I65.23 BILATERAL CAROTID ARTERY STENOSIS: ICD-10-CM

## 2025-02-06 LAB
VAS AORTA DIST AP: 4.13 CM
VAS AORTA DIST TR: 3.95 CM

## 2025-02-06 PROCEDURE — 93976 VASCULAR STUDY: CPT

## 2025-02-06 PROCEDURE — G8419 CALC BMI OUT NRM PARAM NOF/U: HCPCS | Performed by: SURGERY

## 2025-02-06 PROCEDURE — 1123F ACP DISCUSS/DSCN MKR DOCD: CPT | Performed by: SURGERY

## 2025-02-06 PROCEDURE — 3017F COLORECTAL CA SCREEN DOC REV: CPT | Performed by: SURGERY

## 2025-02-06 PROCEDURE — 1036F TOBACCO NON-USER: CPT | Performed by: SURGERY

## 2025-02-06 PROCEDURE — G8427 DOCREV CUR MEDS BY ELIG CLIN: HCPCS | Performed by: SURGERY

## 2025-02-06 PROCEDURE — 99213 OFFICE O/P EST LOW 20 MIN: CPT | Performed by: SURGERY

## 2025-02-06 PROCEDURE — 1159F MED LIST DOCD IN RCRD: CPT | Performed by: SURGERY

## 2025-02-06 NOTE — PROGRESS NOTES
Chief Complaint:   Chief Complaint   Patient presents with    Check-Up     aaa         HPI: Patient came to the office with his wife, for evaluation of multiple vascular issues including bilateral carotid artery disease, mild to moderate severity and infrarenal abdominal aortic aneurysm    Patient denies any abdominal pain or back pain      Patient denies any focal lateralizing neurological symptoms like loss of speech, vision or loss of function of extremity    Patient can walk a few blocks with no difficulty, and denies any symptoms of rest pain    Allergies   Allergen Reactions    Lisinopril        Current Outpatient Medications   Medication Sig Dispense Refill    nebivolol (BYSTOLIC) 10 MG tablet Take by mouth daily      omeprazole (PRILOSEC) 20 MG delayed release capsule Take 1 capsule by mouth daily      olmesartan-hydroCHLOROthiazide (BENICAR HCT) 40-25 MG per tablet Take 1 tablet by mouth daily      clopidogrel (PLAVIX) 75 MG tablet Take 1 tablet by mouth daily      amLODIPine (NORVASC) 5 MG tablet Take 2 tablets by mouth daily      rosuvastatin (CRESTOR) 20 MG tablet Take 1 tablet by mouth daily 30 tablet 3    fluticasone (FLONASE) 50 MCG/ACT nasal spray 1 spray by Each Nostril route daily      metoprolol succinate (TOPROL XL) 50 MG extended release tablet Take 50 mg by mouth in the morning.      prasugrel (EFFIENT) 10 MG TABS Take 1 tablet by mouth daily 90 tablet 3    aspirin 81 MG EC tablet Take 81 mg by mouth daily       No current facility-administered medications for this visit.       Past Medical History:   Diagnosis Date    Arthritis     Bilateral carotid artery stenosis 06/03/2021    CAD in native artery 06/16/2022    Cancer (HCC)     prostate 2009    DILLAN (cerebral atherosclerosis)     Gout     History of tobacco use 06/03/2021    Hypercholesterolemia     Stenosis of both external carotid arteries 06/03/2021       Past Surgical History:   Procedure Laterality Date    CARDIAC CATHETERIZATION

## 2025-06-26 ENCOUNTER — HOSPITAL ENCOUNTER (INPATIENT)
Age: 75
LOS: 1 days | Discharge: HOME | End: 2025-06-27
Attending: EMERGENCY MEDICINE | Admitting: INTERNAL MEDICINE
Payer: MEDICARE

## 2025-06-26 ENCOUNTER — APPOINTMENT (OUTPATIENT)
Dept: GENERAL RADIOLOGY | Age: 75
End: 2025-06-26
Payer: MEDICARE

## 2025-06-26 DIAGNOSIS — R00.1 SYMPTOMATIC BRADYCARDIA: Primary | ICD-10-CM

## 2025-06-26 DIAGNOSIS — R07.9 CHEST PAIN, UNSPECIFIED TYPE: ICD-10-CM

## 2025-06-26 LAB
ALBUMIN SERPL-MCNC: 4.7 G/DL (ref 3.5–5.2)
ALP SERPL-CCNC: 62 U/L (ref 40–129)
ALT SERPL-CCNC: 15 U/L (ref 0–50)
ANION GAP SERPL CALCULATED.3IONS-SCNC: 12 MMOL/L (ref 7–16)
AST SERPL-CCNC: 19 U/L (ref 0–50)
BASOPHILS # BLD: 0.07 K/UL (ref 0–0.2)
BASOPHILS NFR BLD: 1 % (ref 0–2)
BILIRUB SERPL-MCNC: 0.4 MG/DL (ref 0–1.2)
BUN SERPL-MCNC: 26 MG/DL (ref 8–23)
CALCIUM SERPL-MCNC: 9.4 MG/DL (ref 8.8–10.2)
CHLORIDE SERPL-SCNC: 104 MMOL/L (ref 98–107)
CO2 SERPL-SCNC: 23 MMOL/L (ref 22–29)
CREAT SERPL-MCNC: 1.2 MG/DL (ref 0.7–1.2)
EKG ATRIAL RATE: 47 BPM
EKG P AXIS: 55 DEGREES
EKG P-R INTERVAL: 178 MS
EKG Q-T INTERVAL: 482 MS
EKG QRS DURATION: 148 MS
EKG QTC CALCULATION (BAZETT): 426 MS
EKG R AXIS: 48 DEGREES
EKG T AXIS: 34 DEGREES
EKG VENTRICULAR RATE: 47 BPM
EOSINOPHIL # BLD: 0.19 K/UL (ref 0.05–0.5)
EOSINOPHILS RELATIVE PERCENT: 3 % (ref 0–6)
ERYTHROCYTE [DISTWIDTH] IN BLOOD BY AUTOMATED COUNT: 13.6 % (ref 11.5–15)
GFR, ESTIMATED: 67 ML/MIN/1.73M2
GLUCOSE SERPL-MCNC: 90 MG/DL (ref 74–99)
HCT VFR BLD AUTO: 38.7 % (ref 37–54)
HGB BLD-MCNC: 12.8 G/DL (ref 12.5–16.5)
IMM GRANULOCYTES # BLD AUTO: <0.03 K/UL (ref 0–0.58)
IMM GRANULOCYTES NFR BLD: 0 % (ref 0–5)
INR PPP: 1.2
LYMPHOCYTES NFR BLD: 1.46 K/UL (ref 1.5–4)
LYMPHOCYTES RELATIVE PERCENT: 21 % (ref 20–42)
MAGNESIUM SERPL-MCNC: 2.3 MG/DL (ref 1.6–2.4)
MCH RBC QN AUTO: 30.4 PG (ref 26–35)
MCHC RBC AUTO-ENTMCNC: 33.1 G/DL (ref 32–34.5)
MCV RBC AUTO: 91.9 FL (ref 80–99.9)
MONOCYTES NFR BLD: 0.57 K/UL (ref 0.1–0.95)
MONOCYTES NFR BLD: 8 % (ref 2–12)
NEUTROPHILS NFR BLD: 67 % (ref 43–80)
NEUTS SEG NFR BLD: 4.59 K/UL (ref 1.8–7.3)
PLATELET # BLD AUTO: 236 K/UL (ref 130–450)
PMV BLD AUTO: 10.4 FL (ref 7–12)
POTASSIUM SERPL-SCNC: 4.3 MMOL/L (ref 3.5–5.1)
PROT SERPL-MCNC: 7.5 G/DL (ref 6.4–8.3)
PROTHROMBIN TIME: 12.9 SEC (ref 9.3–12.4)
RBC # BLD AUTO: 4.21 M/UL (ref 3.8–5.8)
SODIUM SERPL-SCNC: 140 MMOL/L (ref 136–145)
TROPONIN I SERPL HS-MCNC: 10 NG/L (ref 0–22)
TROPONIN I SERPL HS-MCNC: 9 NG/L (ref 0–22)
WBC OTHER # BLD: 6.9 K/UL (ref 4.5–11.5)

## 2025-06-26 PROCEDURE — 2140000000 HC CCU INTERMEDIATE R&B

## 2025-06-26 PROCEDURE — 93010 ELECTROCARDIOGRAM REPORT: CPT | Performed by: INTERNAL MEDICINE

## 2025-06-26 PROCEDURE — 2500000003 HC RX 250 WO HCPCS: Performed by: NURSE PRACTITIONER

## 2025-06-26 PROCEDURE — 80053 COMPREHEN METABOLIC PANEL: CPT

## 2025-06-26 PROCEDURE — 85610 PROTHROMBIN TIME: CPT

## 2025-06-26 PROCEDURE — 71046 X-RAY EXAM CHEST 2 VIEWS: CPT

## 2025-06-26 PROCEDURE — 99285 EMERGENCY DEPT VISIT HI MDM: CPT

## 2025-06-26 PROCEDURE — 84484 ASSAY OF TROPONIN QUANT: CPT

## 2025-06-26 PROCEDURE — 83735 ASSAY OF MAGNESIUM: CPT

## 2025-06-26 PROCEDURE — 93005 ELECTROCARDIOGRAM TRACING: CPT | Performed by: PHYSICIAN ASSISTANT

## 2025-06-26 PROCEDURE — 85025 COMPLETE CBC W/AUTO DIFF WBC: CPT

## 2025-06-26 RX ORDER — ONDANSETRON 2 MG/ML
4 INJECTION INTRAMUSCULAR; INTRAVENOUS EVERY 6 HOURS PRN
Status: DISCONTINUED | OUTPATIENT
Start: 2025-06-26 | End: 2025-06-28 | Stop reason: HOSPADM

## 2025-06-26 RX ORDER — AMLODIPINE BESYLATE 5 MG/1
5 TABLET ORAL 2 TIMES DAILY
Status: DISCONTINUED | OUTPATIENT
Start: 2025-06-27 | End: 2025-06-28 | Stop reason: HOSPADM

## 2025-06-26 RX ORDER — PRASUGREL 10 MG/1
10 TABLET, FILM COATED ORAL DAILY
Status: DISCONTINUED | OUTPATIENT
Start: 2025-06-27 | End: 2025-06-27

## 2025-06-26 RX ORDER — METOPROLOL SUCCINATE 50 MG/1
50 TABLET, EXTENDED RELEASE ORAL DAILY
Status: DISCONTINUED | OUTPATIENT
Start: 2025-06-27 | End: 2025-06-27

## 2025-06-26 RX ORDER — ACETAMINOPHEN 325 MG/1
650 TABLET ORAL EVERY 6 HOURS PRN
Status: DISCONTINUED | OUTPATIENT
Start: 2025-06-26 | End: 2025-06-28 | Stop reason: HOSPADM

## 2025-06-26 RX ORDER — SODIUM CHLORIDE 9 MG/ML
INJECTION, SOLUTION INTRAVENOUS PRN
Status: DISCONTINUED | OUTPATIENT
Start: 2025-06-26 | End: 2025-06-28 | Stop reason: HOSPADM

## 2025-06-26 RX ORDER — SODIUM CHLORIDE 0.9 % (FLUSH) 0.9 %
5-40 SYRINGE (ML) INJECTION PRN
Status: DISCONTINUED | OUTPATIENT
Start: 2025-06-26 | End: 2025-06-28 | Stop reason: HOSPADM

## 2025-06-26 RX ORDER — ACETAMINOPHEN 650 MG/1
650 SUPPOSITORY RECTAL EVERY 6 HOURS PRN
Status: DISCONTINUED | OUTPATIENT
Start: 2025-06-26 | End: 2025-06-28 | Stop reason: HOSPADM

## 2025-06-26 RX ORDER — ROSUVASTATIN CALCIUM 20 MG/1
20 TABLET, COATED ORAL DAILY
Status: DISCONTINUED | OUTPATIENT
Start: 2025-06-27 | End: 2025-06-28 | Stop reason: HOSPADM

## 2025-06-26 RX ORDER — OLMESARTAN MEDOXOMIL AND HYDROCHLOROTHIAZIDE 40/25 40; 25 MG/1; MG/1
1 TABLET ORAL DAILY
Status: DISCONTINUED | OUTPATIENT
Start: 2025-06-27 | End: 2025-06-26

## 2025-06-26 RX ORDER — HYDROCHLOROTHIAZIDE 25 MG/1
25 TABLET ORAL DAILY
Status: DISCONTINUED | OUTPATIENT
Start: 2025-06-27 | End: 2025-06-28 | Stop reason: HOSPADM

## 2025-06-26 RX ORDER — POTASSIUM CHLORIDE 1500 MG/1
40 TABLET, EXTENDED RELEASE ORAL PRN
Status: DISCONTINUED | OUTPATIENT
Start: 2025-06-26 | End: 2025-06-28 | Stop reason: HOSPADM

## 2025-06-26 RX ORDER — SODIUM CHLORIDE 0.9 % (FLUSH) 0.9 %
5-40 SYRINGE (ML) INJECTION EVERY 12 HOURS SCHEDULED
Status: DISCONTINUED | OUTPATIENT
Start: 2025-06-26 | End: 2025-06-28 | Stop reason: HOSPADM

## 2025-06-26 RX ORDER — POLYETHYLENE GLYCOL 3350 17 G/17G
17 POWDER, FOR SOLUTION ORAL DAILY PRN
Status: DISCONTINUED | OUTPATIENT
Start: 2025-06-26 | End: 2025-06-28 | Stop reason: HOSPADM

## 2025-06-26 RX ORDER — FLUTICASONE PROPIONATE 50 MCG
1 SPRAY, SUSPENSION (ML) NASAL DAILY PRN
Status: DISCONTINUED | OUTPATIENT
Start: 2025-06-26 | End: 2025-06-28 | Stop reason: HOSPADM

## 2025-06-26 RX ORDER — POTASSIUM CHLORIDE 7.45 MG/ML
10 INJECTION INTRAVENOUS PRN
Status: DISCONTINUED | OUTPATIENT
Start: 2025-06-26 | End: 2025-06-28 | Stop reason: HOSPADM

## 2025-06-26 RX ORDER — PANTOPRAZOLE SODIUM 40 MG/1
40 TABLET, DELAYED RELEASE ORAL
Status: DISCONTINUED | OUTPATIENT
Start: 2025-06-27 | End: 2025-06-28 | Stop reason: HOSPADM

## 2025-06-26 RX ORDER — ASPIRIN 81 MG/1
81 TABLET ORAL DAILY
Status: DISCONTINUED | OUTPATIENT
Start: 2025-06-27 | End: 2025-06-28 | Stop reason: HOSPADM

## 2025-06-26 RX ORDER — ENOXAPARIN SODIUM 100 MG/ML
40 INJECTION SUBCUTANEOUS DAILY
Status: DISCONTINUED | OUTPATIENT
Start: 2025-06-27 | End: 2025-06-28 | Stop reason: HOSPADM

## 2025-06-26 RX ORDER — LOSARTAN POTASSIUM 50 MG/1
100 TABLET ORAL DAILY
Status: DISCONTINUED | OUTPATIENT
Start: 2025-06-27 | End: 2025-06-28 | Stop reason: HOSPADM

## 2025-06-26 RX ORDER — ONDANSETRON 4 MG/1
4 TABLET, ORALLY DISINTEGRATING ORAL EVERY 8 HOURS PRN
Status: DISCONTINUED | OUTPATIENT
Start: 2025-06-26 | End: 2025-06-28 | Stop reason: HOSPADM

## 2025-06-26 RX ORDER — MAGNESIUM SULFATE IN WATER 40 MG/ML
2000 INJECTION, SOLUTION INTRAVENOUS PRN
Status: DISCONTINUED | OUTPATIENT
Start: 2025-06-26 | End: 2025-06-28 | Stop reason: HOSPADM

## 2025-06-26 RX ADMIN — SODIUM CHLORIDE, PRESERVATIVE FREE 10 ML: 5 INJECTION INTRAVENOUS at 23:15

## 2025-06-26 ASSESSMENT — PAIN - FUNCTIONAL ASSESSMENT
PAIN_FUNCTIONAL_ASSESSMENT: NONE - DENIES PAIN
PAIN_FUNCTIONAL_ASSESSMENT: NONE - DENIES PAIN

## 2025-06-26 ASSESSMENT — LIFESTYLE VARIABLES
HOW OFTEN DO YOU HAVE A DRINK CONTAINING ALCOHOL: NEVER
HOW MANY STANDARD DRINKS CONTAINING ALCOHOL DO YOU HAVE ON A TYPICAL DAY: PATIENT DOES NOT DRINK

## 2025-06-26 NOTE — ED PROVIDER NOTES
HPI:  6/26/25,   Time: 2:47 PM EDT       Thierry Nuno is a 75 y.o. male presenting to the ED for light headed/low hr/sent by pcp, beginning 1 day ago.  The complaint has been persistent, moderate in severity, and worsened by nothing.  Sent by PCP.  State had brief episode of sharp chest pain yesterday lasting 10 seconds.  That is resolved.  However had some bradycardia and nausea with lightheadedness today.  No fevers chills or sweats.  No vomiting or diarrhea    Review of Systems:   Pertinent positives and negatives are stated within HPI, all other systems reviewed and are negative.          --------------------------------------------- PAST HISTORY ---------------------------------------------  Past Medical History:  has a past medical history of Arthritis, Bilateral carotid artery stenosis, CAD in native artery, Cancer (HCC), DILLAN (cerebral atherosclerosis), Gout, History of tobacco use, Hypercholesterolemia, and Stenosis of both external carotid arteries.    Past Surgical History:  has a past surgical history that includes joint replacement; Prostatectomy (01/01/2009); Cardiac catheterization; and cardiovascular stress test (N/A, 06/15/2022).    Social History:  reports that he quit smoking about 8 years ago. His smoking use included cigarettes. He started smoking about 38 years ago. He has a 30 pack-year smoking history. He has never used smokeless tobacco. He reports current alcohol use of about 1.0 standard drink of alcohol per week. He reports that he does not use drugs.    Family History: family history includes Cancer in his father and mother; Mult Sclerosis in his sister.     The patient’s home medications have been reviewed.    Allergies: Lisinopril        ---------------------------------------------------PHYSICAL EXAM--------------------------------------    Constitutional/General: Alert and oriented x3, well appearing, non toxic in NAD  Head: Normocephalic and atraumatic  Eyes: PERRL, EOMI,

## 2025-06-26 NOTE — ED TRIAGE NOTES
Department of Emergency Medicine  FIRST PROVIDER TRIAGE NOTE             Independent MLP           6/26/25  1:24 PM EDT    Date of Encounter: 6/26/25   MRN: [id number]      HPI: Thierry Nuno is a 75 y.o. male who presents to the ED for Bradycardia (Was having chest pain, not currently having it, went to pcp and was sent her for slow HR )     Stent placed 3 years ago. Last saw Dr. Shea 3 months ago. Chest pain last night and this morning.     ROS: Negative for fever or cough.    PE: Gen Appearance/Constitutional: alert  CV: bradicardia  Pulm: CTA bilat     Initial Plan of Care: All treatment areas with department are currently occupied. Plan to order/Initiate the following while awaiting opening in ED: EKG.  Initiate Treatment-Testing, Proceed toTreatment Area When Bed Available for ED Attending/MLP to Continue Care    Electronically signed by Latosha Almeida PA-C   DD: 6/26/25

## 2025-06-27 VITALS
RESPIRATION RATE: 24 BRPM | SYSTOLIC BLOOD PRESSURE: 113 MMHG | HEART RATE: 58 BPM | TEMPERATURE: 98.2 F | HEIGHT: 71 IN | OXYGEN SATURATION: 96 % | DIASTOLIC BLOOD PRESSURE: 81 MMHG | WEIGHT: 210.1 LBS | BODY MASS INDEX: 29.41 KG/M2

## 2025-06-27 PROBLEM — K21.9 GERD (GASTROESOPHAGEAL REFLUX DISEASE): Status: ACTIVE | Noted: 2025-06-27

## 2025-06-27 LAB
ALBUMIN SERPL-MCNC: 4.3 G/DL (ref 3.5–5.2)
ALP SERPL-CCNC: 62 U/L (ref 40–129)
ALT SERPL-CCNC: 13 U/L (ref 0–50)
ANION GAP SERPL CALCULATED.3IONS-SCNC: 12 MMOL/L (ref 7–16)
AST SERPL-CCNC: 19 U/L (ref 0–50)
BASOPHILS # BLD: 0.07 K/UL (ref 0–0.2)
BASOPHILS NFR BLD: 1 % (ref 0–2)
BILIRUB DIRECT SERPL-MCNC: 0.2 MG/DL (ref 0–0.2)
BILIRUB INDIRECT SERPL-MCNC: 0.2 MG/DL (ref 0–1)
BILIRUB SERPL-MCNC: 0.4 MG/DL (ref 0–1.2)
BUN SERPL-MCNC: 21 MG/DL (ref 8–23)
CALCIUM SERPL-MCNC: 9.4 MG/DL (ref 8.8–10.2)
CHLORIDE SERPL-SCNC: 105 MMOL/L (ref 98–107)
CHOLEST SERPL-MCNC: 113 MG/DL
CO2 SERPL-SCNC: 25 MMOL/L (ref 22–29)
CREAT SERPL-MCNC: 1 MG/DL (ref 0.7–1.2)
EOSINOPHIL # BLD: 0.21 K/UL (ref 0.05–0.5)
EOSINOPHILS RELATIVE PERCENT: 3 % (ref 0–6)
ERYTHROCYTE [DISTWIDTH] IN BLOOD BY AUTOMATED COUNT: 13.3 % (ref 11.5–15)
GFR, ESTIMATED: 79 ML/MIN/1.73M2
GLUCOSE SERPL-MCNC: 92 MG/DL (ref 74–99)
HBA1C MFR BLD: 5.8 % (ref 4–5.6)
HCT VFR BLD AUTO: 38.1 % (ref 37–54)
HDLC SERPL-MCNC: 29 MG/DL
HGB BLD-MCNC: 12.6 G/DL (ref 12.5–16.5)
IMM GRANULOCYTES # BLD AUTO: <0.03 K/UL (ref 0–0.58)
IMM GRANULOCYTES NFR BLD: 0 % (ref 0–5)
LDLC SERPL CALC-MCNC: 48 MG/DL
LYMPHOCYTES NFR BLD: 1.31 K/UL (ref 1.5–4)
LYMPHOCYTES RELATIVE PERCENT: 20 % (ref 20–42)
MCH RBC QN AUTO: 30.5 PG (ref 26–35)
MCHC RBC AUTO-ENTMCNC: 33.1 G/DL (ref 32–34.5)
MCV RBC AUTO: 92.3 FL (ref 80–99.9)
MONOCYTES NFR BLD: 0.66 K/UL (ref 0.1–0.95)
MONOCYTES NFR BLD: 10 % (ref 2–12)
NEUTROPHILS NFR BLD: 65 % (ref 43–80)
NEUTS SEG NFR BLD: 4.26 K/UL (ref 1.8–7.3)
PLATELET # BLD AUTO: 217 K/UL (ref 130–450)
PMV BLD AUTO: 10.3 FL (ref 7–12)
POTASSIUM SERPL-SCNC: 4.3 MMOL/L (ref 3.5–5.1)
PROT SERPL-MCNC: 6.8 G/DL (ref 6.4–8.3)
RBC # BLD AUTO: 4.13 M/UL (ref 3.8–5.8)
SODIUM SERPL-SCNC: 141 MMOL/L (ref 136–145)
TRIGL SERPL-MCNC: 176 MG/DL
TROPONIN I SERPL HS-MCNC: 11 NG/L (ref 0–22)
VLDLC SERPL CALC-MCNC: 35 MG/DL
WBC OTHER # BLD: 6.5 K/UL (ref 4.5–11.5)

## 2025-06-27 PROCEDURE — 36415 COLL VENOUS BLD VENIPUNCTURE: CPT

## 2025-06-27 PROCEDURE — 84484 ASSAY OF TROPONIN QUANT: CPT

## 2025-06-27 PROCEDURE — 2500000003 HC RX 250 WO HCPCS: Performed by: NURSE PRACTITIONER

## 2025-06-27 PROCEDURE — 80061 LIPID PANEL: CPT

## 2025-06-27 PROCEDURE — 6370000000 HC RX 637 (ALT 250 FOR IP): Performed by: NURSE PRACTITIONER

## 2025-06-27 PROCEDURE — 85025 COMPLETE CBC W/AUTO DIFF WBC: CPT

## 2025-06-27 PROCEDURE — 6360000002 HC RX W HCPCS: Performed by: NURSE PRACTITIONER

## 2025-06-27 PROCEDURE — 80053 COMPREHEN METABOLIC PANEL: CPT

## 2025-06-27 PROCEDURE — 82248 BILIRUBIN DIRECT: CPT

## 2025-06-27 PROCEDURE — 83036 HEMOGLOBIN GLYCOSYLATED A1C: CPT

## 2025-06-27 RX ORDER — NEBIVOLOL 10 MG/1
5 TABLET ORAL DAILY
Qty: 30 TABLET | Refills: 3 | Status: SHIPPED | OUTPATIENT
Start: 2025-06-27

## 2025-06-27 RX ORDER — PANTOPRAZOLE SODIUM 40 MG/1
40 TABLET, DELAYED RELEASE ORAL
Qty: 30 TABLET | Refills: 3 | Status: SHIPPED | OUTPATIENT
Start: 2025-06-28

## 2025-06-27 RX ADMIN — AMLODIPINE BESYLATE 5 MG: 5 TABLET ORAL at 19:43

## 2025-06-27 RX ADMIN — SODIUM CHLORIDE, PRESERVATIVE FREE 10 ML: 5 INJECTION INTRAVENOUS at 08:34

## 2025-06-27 RX ADMIN — PANTOPRAZOLE SODIUM 40 MG: 40 TABLET, DELAYED RELEASE ORAL at 05:39

## 2025-06-27 RX ADMIN — AMLODIPINE BESYLATE 5 MG: 5 TABLET ORAL at 08:34

## 2025-06-27 RX ADMIN — AMLODIPINE BESYLATE 5 MG: 5 TABLET ORAL at 00:12

## 2025-06-27 RX ADMIN — HYDROCHLOROTHIAZIDE 25 MG: 25 TABLET ORAL at 08:34

## 2025-06-27 RX ADMIN — ASPIRIN 81 MG: 81 TABLET, COATED ORAL at 08:34

## 2025-06-27 RX ADMIN — ENOXAPARIN SODIUM 40 MG: 100 INJECTION SUBCUTANEOUS at 08:34

## 2025-06-27 RX ADMIN — SODIUM CHLORIDE, PRESERVATIVE FREE 10 ML: 5 INJECTION INTRAVENOUS at 19:44

## 2025-06-27 RX ADMIN — ROSUVASTATIN 20 MG: 20 TABLET, FILM COATED ORAL at 08:34

## 2025-06-27 RX ADMIN — LOSARTAN POTASSIUM 100 MG: 50 TABLET, FILM COATED ORAL at 08:34

## 2025-06-27 NOTE — CARE COORDINATION
Care Coordination:  75 yr old admitted with symptomatic bradycardia and chest pain.  Has hx and is patient of Dr. Shea.  Awaiting input.  Met with wife and patient.  He lives with wife in 2 floor home with 2STE.  MEMO Umaña is PCP.  Pharmacy is  in [place]. He owns a cane but does not use He is independent. Wife in good health and drives.  Discharge plan is home .  Wife to transport.  No needs noted at this time.  Can follow.     Case Management Assessment  Initial Evaluation    Date/Time of Evaluation: 6/27/2025 11:50 AM  Assessment Completed by: NATALIA Rashid    If patient is discharged prior to next notation, then this note serves as note for discharge by case management.    Patient Name: Thierry Nuno                   YOB: 1950  Diagnosis: Symptomatic bradycardia [R00.1]  Chest pain, unspecified type [R07.9]                   Date / Time: 6/26/2025  1:26 PM    Patient Admission Status: Inpatient   Readmission Risk (Low < 19, Mod (19-27), High > 27): Readmission Risk Score: 7.4    Current PCP: Thierry Mathis, DO  PCP verified by CM? Yes    Chart Reviewed: Yes      History Provided by: Patient, Spouse  Patient Orientation: Alert and Oriented    Patient Cognition: Alert    Hospitalization in the last 30 days (Readmission):  No    If yes, Readmission Assessment in CM Navigator will be completed.    Advance Directives:      Code Status: Full Code   Patient's Primary Decision Maker is: Legal Next of Kin    Primary Decision Maker: Becky Nuno - Spouse - [phone]    Discharge Planning:    Patient lives with: Spouse/Significant Other Type of Home: House  Primary Care Giver: Spouse  Patient Support Systems include: Spouse/Significant Other   Current Financial resources:    Current community resources:    Current services prior to admission: None            Current DME:              Type of Home Care services:  None    ADLS  Prior functional level: Independent in

## 2025-06-27 NOTE — PLAN OF CARE
Problem: Discharge Planning  Goal: Discharge to home or other facility with appropriate resources  6/27/2025 1249 by Barb Gee RN  Outcome: Progressing  6/26/2025 2341 by Tamara Murphy RN  Outcome: Progressing  Flowsheets  Taken 6/26/2025 2256  Discharge to home or other facility with appropriate resources:   Identify barriers to discharge with patient and caregiver   Arrange for needed discharge resources and transportation as appropriate   Identify discharge learning needs (meds, wound care, etc)  Taken 6/26/2025 2245  Discharge to home or other facility with appropriate resources:   Identify barriers to discharge with patient and caregiver   Arrange for needed discharge resources and transportation as appropriate   Identify discharge learning needs (meds, wound care, etc)     Problem: Safety - Adult  Goal: Free from fall injury  6/27/2025 1249 by Barb Gee RN  Outcome: Progressing  6/26/2025 2341 by Tamara Murphy RN  Outcome: Progressing     Problem: Cardiovascular - Adult  Goal: Maintains optimal cardiac output and hemodynamic stability  Outcome: Progressing     Problem: Gastrointestinal - Adult  Goal: Maintains adequate nutritional intake  Outcome: Progressing     Problem: Metabolic/Fluid and Electrolytes - Adult  Goal: Electrolytes maintained within normal limits  Outcome: Progressing

## 2025-06-27 NOTE — ACP (ADVANCE CARE PLANNING)
Advance Care Planning   The patient has the following advanced directives on file:  Advance Directives       Power of  Living Will ACP-Advance Directive ACP-Power of     Not on File Not on File Not on File Not on File            The patient has appointed the following active healthcare agents:    Primary Decision Maker: Becky Nuno - Spouse - [phone]    The Patient has the following current code status:    Code Status: Full Code    Visit Documentation:  Next of kin      NATALIA Rashid  6/27/2025

## 2025-06-27 NOTE — PROGRESS NOTES
4 Eyes Skin Assessment     NAME:  Thierry Nuno  YOB: 1950  MEDICAL RECORD NUMBER:  [id number]    The patient is being assessed for  Admission    I agree that at least one RN has performed a thorough Head to Toe Skin Assessment on the patient. ALL assessment sites listed below have been assessed.      Areas assessed by both nurses:    Head, Face, Ears, Shoulders, Back, Chest, Arms, Elbows, Hands, Sacrum. Buttock, Coccyx, Ischium, and Legs. Feet and Heels        Does the Patient have a Wound? No noted wound(s)       Bryn Prevention initiated by RN: Yes  Wound Care Orders initiated by RN: No    Pressure Injury (Stage 1,2,3,4, Unstageable, DTI, NWPT, and Complex wounds) if present, place Wound referral order by RN under : No    New Ostomies, if present place, Ostomy referral order under : No     Nurse 1 eSignature: Electronically signed by Tamara Murphy RN on 6/26/25 at 11:13 PM EDT    **SHARE this note so that the co-signing nurse can place an eSignature**    Nurse 2 eSignature: {Esignature:[id number]}

## 2025-06-27 NOTE — PROGRESS NOTES
The Patient and his wife were present during the visit the  provided sustaining ministry presence and active listening. The  will follow as needed. If additional support is needed please reach out to [facility] (ext [phone]).    Rev KAMILLE Sosa MDIV,

## 2025-06-27 NOTE — PROGRESS NOTES
Dr. Shea answering service notified of consult for symptomatic bradycardia.  Message will be given to him by answering service.

## 2025-06-27 NOTE — PLAN OF CARE
Problem: Discharge Planning  Goal: Discharge to home or other facility with appropriate resources  Outcome: Progressing  Flowsheets  Taken 6/26/2025 2256  Discharge to home or other facility with appropriate resources:   Identify barriers to discharge with patient and caregiver   Arrange for needed discharge resources and transportation as appropriate   Identify discharge learning needs (meds, wound care, etc)  Taken 6/26/2025 2245  Discharge to home or other facility with appropriate resources:   Identify barriers to discharge with patient and caregiver   Arrange for needed discharge resources and transportation as appropriate   Identify discharge learning needs (meds, wound care, etc)     Problem: Safety - Adult  Goal: Free from fall injury  Outcome: Progressing

## 2025-06-27 NOTE — PROGRESS NOTES
Admitted for symptomatic bradycardia-currently rate in the low 50s on telemetry monitor  Full H&P to follow  Follows with Dr. Shea  Hold nevobolol and hold Plavix  Await cardiology input, may need electrophysiology-will defer to Dr. Shea  He is no longer symptomatic    Anaya Tobias MD  6/27/2025

## 2025-06-28 NOTE — PLAN OF CARE
Problem: Discharge Planning  Goal: Discharge to home or other facility with appropriate resources  6/27/2025 2205 by Michael Shaw RN  Outcome: Completed  6/27/2025 1249 by Barb Gee RN  Outcome: Progressing     Problem: Safety - Adult  Goal: Free from fall injury  6/27/2025 2205 by Michael Shaw RN  Outcome: Completed  6/27/2025 1249 by Barb Gee RN  Outcome: Progressing     Problem: Cardiovascular - Adult  Goal: Maintains optimal cardiac output and hemodynamic stability  6/27/2025 2205 by Michael Shaw RN  Outcome: Completed  6/27/2025 1249 by Barb Gee RN  Outcome: Progressing     Problem: Gastrointestinal - Adult  Goal: Maintains adequate nutritional intake  6/27/2025 2205 by Michael Shaw RN  Outcome: Completed  6/27/2025 1249 by Barb Gee RN  Outcome: Progressing     Problem: Gastrointestinal - Adult  Goal: Maintains adequate nutritional intake  6/27/2025 2205 by Michael Shaw RN  Outcome: Completed  6/27/2025 1249 by Barb Gee RN  Outcome: Progressing     Problem: Metabolic/Fluid and Electrolytes - Adult  Goal: Electrolytes maintained within normal limits  6/27/2025 2205 by Michael Shaw RN  Outcome: Completed  6/27/2025 1249 by Barb Gee RN  Outcome: Progressing

## 2025-06-28 NOTE — H&P
[facility]  History and Physical      CHIEF COMPLAINT:    Chief Complaint   Patient presents with    Bradycardia     Was having chest pain, not currently having it, went to pcp and was sent her for slow HR         Patient of Thierry Mathis DO presents with:  Symptomatic bradycardia    History of Present Illness:   Mr. Nuno is a 75 year old male with a past medical history of CAD s/p PCI in 2022, bilateral carotid stenosis, Prostate CA, HLD, Gout Arthritis, and tobacco abuse.  Mr. Nuno presented to [facility] ED on 06/26/2025 with complaints of chest pain. He presented to his PCP for chest pain and was found to have bradycardia and was subsequently sent to the ED for further evaluation.  Upon arrival to the ED, his VS were 97.3-53-18-95%RA-152/71. He was noted to have nausea with lightheadedness during his ED course. EKG SB with HR 50. CXR unremarkable. He was admitted to a telemetry monitored unit for continued management.  On evaluation he tells me he is completely asymptomatic-heart rate is in the low 50s on telemetry monitoring.  Wife at bedside.  He denies any new medications or changes in meds recently.    REVIEW OF SYSTEMS:  Pertinent negatives are above in HPI.  10 point ROS otherwise negative.      Past Medical History:   Diagnosis Date    Arthritis     Bilateral carotid artery stenosis 06/03/2021    CAD in native artery 06/16/2022    Cancer (HCC)     prostate 2009    DILLAN (cerebral atherosclerosis)     Gout     History of tobacco use 06/03/2021    Hypercholesterolemia     Stenosis of both external carotid arteries 06/03/2021         Past Surgical History:   Procedure Laterality Date    CARDIAC CATHETERIZATION      CARDIOVASCULAR STRESS TEST N/A 06/15/2022    lexiscan stress test    JOINT REPLACEMENT      knee    PROSTATECTOMY  01/01/2009       Medications Prior to Admission:    Medications Prior to Admission: nebivolol (BYSTOLIC) 10 MG tablet, Take by mouth daily  omeprazole (PRILOSEC)

## 2025-06-28 NOTE — PROGRESS NOTES
Notified NP Nisha Learn that patient was cleared for discharge by . Clarifying all discharge instructions and medications are correct and finished.

## 2025-06-28 NOTE — CONSULTS
CARDIOLOGY CONSULTATION    Patient Name:  Thierry Nuno    :  1950    Reason for Consultation:   Chest discomfort    History of Present Illness:   Thierry Nuno presents to [facility] following the sudden onset of feeling nauseated preceded by a fleeting sharp stabbing right lower parasternal pain on Wednesday.  He went to see his primary care physician Dr. Thierry Mathis who obtained an EKG demonstrating a heart rate of approximately 47 and instructed him to come to the emergency room for further evaluation.  Prior to going to the doctor's office he noted no lightheadedness whatsoever but just simply felt fatigued and nauseated.  Upon further questioning he notes similar symptoms whenever he has problems with gastroesophageal reflux which has occurred on multiple occasions in the past.  Upon further questioning he also notes that he did go up to eat in a drive-through restaurant prior to this occurring.  He had no diarrhea however.  He has known coronary artery disease and underwent successful deployment of a drug-eluting stent to his left anterior descending artery in  and he has remained for the most part asymptomatic ever since.  Importantly he also has an underlying infrarenal saccular aneurysm.  He is now admitted for further observation but cardiac enzymes have remained normal and heart rate has remained between 50 and 65/min with rare episodes of 47/min for which he remains asymptomatic presently.  Past Medical History:   has a past medical history of Arthritis, Bilateral carotid artery stenosis, CAD in native artery, Cancer (HCC), DILLAN (cerebral atherosclerosis), Gout, History of tobacco use, Hypercholesterolemia, and Stenosis of both external carotid arteries.    Surgical History:   has a past surgical history that includes joint replacement; Prostatectomy (2009); Cardiac catheterization; and cardiovascular stress test (N/A, 06/15/2022).     Social

## 2025-07-02 NOTE — DISCHARGE SUMMARY
[facility]   Discharge summary   Patient ID:  Thierry Nuno  [id number]  75 y.o.  1950    Admit date: 6/26/2025    Discharge date and time: 6/27//2025    Patient admitted less than 48 hours.  Please see H&P   Patient is medically stable for discharge to home.      Signed:  Anaya Tobias MD

## 2025-07-10 ENCOUNTER — TRANSCRIBE ORDERS (OUTPATIENT)
Dept: ADMINISTRATIVE | Age: 75
End: 2025-07-10

## 2025-07-10 DIAGNOSIS — Z87.891 PERSONAL HISTORY OF TOBACCO USE, PRESENTING HAZARDS TO HEALTH: Primary | ICD-10-CM

## 2025-07-10 DIAGNOSIS — F17.211 CIGARETTE NICOTINE DEPENDENCE IN REMISSION: ICD-10-CM

## 2025-08-29 ENCOUNTER — HOSPITAL ENCOUNTER (OUTPATIENT)
Dept: CT IMAGING | Age: 75
Discharge: HOME | End: 2025-08-29
Payer: MEDICARE

## 2025-08-29 DIAGNOSIS — Z87.891 PERSONAL HISTORY OF TOBACCO USE, PRESENTING HAZARDS TO HEALTH: ICD-10-CM

## 2025-08-29 DIAGNOSIS — F17.211 CIGARETTE NICOTINE DEPENDENCE IN REMISSION: ICD-10-CM

## 2025-08-29 PROCEDURE — 71271 CT THORAX LUNG CANCER SCR C-: CPT
